# Patient Record
Sex: FEMALE | Employment: FULL TIME | URBAN - METROPOLITAN AREA
[De-identification: names, ages, dates, MRNs, and addresses within clinical notes are randomized per-mention and may not be internally consistent; named-entity substitution may affect disease eponyms.]

---

## 2022-01-10 ENCOUNTER — HOSPITAL ENCOUNTER (OUTPATIENT)
Dept: PHYSICAL THERAPY | Facility: CLINIC | Age: 72
Setting detail: THERAPIES SERIES
Discharge: HOME | End: 2022-01-10
Payer: MEDICARE

## 2022-01-10 PROCEDURE — 97110 THERAPEUTIC EXERCISES: CPT

## 2022-01-10 PROCEDURE — 97161 PT EVAL LOW COMPLEX 20 MIN: CPT

## 2022-01-10 PROCEDURE — 97016 VASOPNEUMATIC DEVICE THERAPY: CPT

## 2022-01-10 NOTE — PRE-CERTIFICATION NOTE
Latanya Fall Risk Assessment    Patient Name:  Ti Wharton  : 1950        Risk Factor Scale  Score   History of Falls [] Yes  [x] No 25  0    Secondary Diagnosis [] Yes  [x] No 15  0    Ambulatory Aid [] Furniture  [] Crutches/cane/walker  [x] None/bedrest/wheelchair/nurse 30  15  0    IV/Heparin Lock [] Yes  [x] No 20  0    Gait/Transferring [] Impaired  [] Weak  [x] Normal/bedrest/immobile 20  10  0    Mental Status [] Forgets limitations  [x] Oriented to own ability 15  0       Total: 0     Based on the Assessment score: check the appropriate box.     [x]  No intervention needed   Low =   Score of 0-24    []  Use standard prevention interventions Moderate =  Score of 24-44   [] Give patient handout and discuss fall prevention strategies   [] Establish goal of education for patient/family RE: fall prevention strategies    []  Use high risk prevention interventions High = Score of 45 and higher   [] Give patient handout and discuss fall prevention strategies   [] Establish goal of education for patient/family Re: fall prevention strategies   [] Discuss lifeline / other resources    Electronically signed by:   Betty Dale, PT  Date: 1/10/2022

## 2022-01-10 NOTE — CONSULTS
[] [address]  [address]  [phone]  F: [phone] [] [address]  [place] [address]   [address]  P: [phone]  F: [phone] [] [place]  [address]  P: [phone]  F: [phone] [] [address]  [facility]   [address]   [place]: [phone]  F: [phone]  [] [address]  P: [phone]  F: [phone]      Physical Therapy Lower Extremity Evaluation    Date:  1/10/2022  Patient: Jeannette Dukes  :  1950  MRN: [id number]  Physician: Dr. Blue Draft: Medicare  Medical Diagnosis: L TKA  Rehab Codes: T84. 093A  Onset date: DOS 10/11/21  Next 's appt. : 22    Subjective:   CC: Pt had a TKA in October, PT was delayed for 2 weeks. Pt had a manipulation on , utilizing a CPM machine 6 hours a day. Pt reports more stiffness versus pain. PMHx: [] Unremarkable [] Diabetes [] HTN  [] Pacemaker   [] MI/Heart Problems [] Cancer [] Arthritis [] Other:              [x] Refer to full medical chart  In EPIC         Tests: [x] X-Ray: [] MRI:  [] Other:    Medications: [x] Refer to full medical record [] None [] Other:  Allergies:      [x] Refer to full medical record [] None [] Other:        Marital Status    Home type One level   Stairs from outside 2 steps, descends lateral, ascends non reciprocally            Hand rail 1 rail   Stairs inside            Hand rail    Employment Resident [employer]   Job status Full Time   Work Activities/duties  Computer, office   Recreational Activities Goes to the gym and does equipment        Pain present?  Denies   Location L knee   Pain Rating currently 0/10   Pain at worse 2/10   Pain at best 0/10   Description of pain Ache at times   Altered Sensation Numbness and stiffness   What makes it worse Resting    What makes it better Movement    Symptom progression Improving    Sleep Not effected         ADL/IADL Previous level of function Current level of function Who currently assists the patient with task   Bathing  [x] Independent  [] Assist [x] Independent  [] Assist    Dress/grooming [x] Independent  [] Assist [x] Independent  [] Assist    Transfer/mobility [x] Independent  [] Assist [x] Independent  [] Assist    Feeding [x] Independent  [] Assist [x] Independent  [] Assist    Toileting [x] Independent  [] Assist [x] Independent  [] Assist    Driving [x] Independent  [] Assist [x] Independent  [] Assist    Housekeeping [x] Independent  [] Assist [x] Independent  [] Assist    Grocery shop/meal prep [x] Independent  [] Assist [x] Independent  [] Assist      Gait Prior level of function Current level of function    [x] Independent  [] Assist [x] Independent  [] Assist   Device: [] Independent [] Independent    [] Straight Cane [] Quad cane [] Straight Cane [] Quad cane    [] Standard walker [] Rolling walker   [] 4 wheeled walker [] Standard walker [] Rolling walker   [] 4 wheeled walker    [] Wheelchair [] Wheelchair           Objective:    ROM  ° A/P STRENGTH    Left Right Left Right   Hip Flex   4/5 4+/5   Ext       ER       IR       ABD       ADD       Knee Flex 96/99 110/116 4/5 4+/5   Ext 6/0 0 3-/5 4+/5       OBSERVATION No Deficit Deficit Not Tested Comments   Posture       Forward Head [] [] []    Rounded Shoulders [] [] []    Observation           Palpation [] [x] [] TTP L knee globally    Sensation [] [] []    Edema [] [] []    Neurological [] [] []    Patellar Mobility [] [] []    Patellar Orientation [] [] []    Gait [] [x] [] Analysis: Lacking full extension at heel strike          FUNCTION Normal Difficult Unable   Sitting [x] [] []   Standing [] [x] []   Ambulation [] [x] []   Groom/Dress [x] [] []   Lift/Carry [x] [] [] Stairs [] [x] []   Bending [] [x] []   Squat [] [x] []   Kneel [] [] [x]                                Functional Test: LEFS Score: 47% functionally impaired       Assessment:  Patient would benefit from skilled physical therapy services in order to: Increase L knee active and passive ROM while also increase L quad strength. Pt will also benefit from continued PT to address gait deficits and improve ability on stairs. Goals:        STG: (to be met in 10 treatments)  1. ? Pain: Decrease pain levels to 0/10  2. ? ROM: Increase flexibility and AROM limitations throughout to equal bilat to reduce difficulty with ADLs, increase L knee AROM to 0-100deg  3. ? Strength: Increase L knee quad strength to 4+/5  4. ? Function: Pt to ascend steps with a reciprocal gait pattern  5. Independent with Home Exercise Programs      LTG: (to be met in 20 treatments)  1. Improve score on assessment tool from 47% impaired to 25% impaired demonstrating an improvement in ADLs  2. Reduce pain levels to 0/10                   Patient goals:    Rehab Potential:  [x] Good  [] Fair  [] Poor   Suggested Professional Referral:  [x] No  [] Yes:  Barriers to Goal Achievement[de-identified]  [x] No  [] Yes:  Domestic Concerns:  [x] No  [] Yes:    Pt. Education:  [x] Plans/Goals, Risks/Benefits discussed  [x] Home exercise program    Method of Education: [x] Verbal  [x] Demo  [x] Written  Comprehension of Education:  [x] Verbalizes understanding. [x] Demonstrates understanding. [x] Needs Review. [] Demonstrates/verbalizes understanding of HEP/Ed previously given. Access Code: [email or web address]  URL: [email or web address]  Date: 77/66/1979  Prepared by: Marjorie Dominguez    Exercises  Small Range Straight Leg Raise - 1 x daily - 7 x weekly - 2 sets - 10 reps  Supine Quad Set - 1 x daily - 7 x weekly - 10 reps - 2 sets - 5\" hold  Standing Knee Flexion Stretch on Step - 1 x daily - 7 x weekly - 2 sets - 10 reps  Supine Heel Slide with Strap - 1 x daily - 7 x weekly - 2 sets - 10 reps  Standing Terminal Knee Extension with Resistance - 1 x daily - 7 x weekly - 2 sets - 10 reps      Treatment Plan:  [x] Therapeutic Exercise    [] Aquatic Therapy  [x] Manual Therapy   [] Electrical Stimulation  [x] Instruction in HEP      [] Lumbar/Cervical Traction  [] Neuromuscular Re-education [x] Cold/hotpack  [] Iontophoresis: 4 mg/mL  [x] Vasocompression (GameReady)                    Dexamethasone Sodium  [x] Gait Training             Phosphate 40-80 mAmin  [] Integrative Dry Needling         []  Medication allergies reviewed for use of    Dexamethasone Sodium Phosphate 4mg/ml     with iontophoresis treatments. Pt is not allergic.     Frequency:  5 x/week for 20 visits; 5x/wk for the first two weeks, 3x/wk after     Todays Treatment:    Exercises:  Exercise  S/p L knee TKA manipulation    Reps/ Time Weight/ Level Comments   Recumbent bike 6' Seat 7          *TKE 2x10 orange    *Stair knee flexion stretch 3x30\"     TG squats 2x10 L18    Heel tap 2x10 2\" Attempted 4\" step, however too difficult with hip compensation noted   *Supine heel slides with strap for overpressure 2x10     *SLR  2x10  Cues to perform quad set first                                              Other:    Vasocompression: 15' L knee 34deg with bolster    Specific Instructions for next treatment: Focus on ROM and quad strengthening     Evaluation Complexity:  History (Personal factors, comorbidities) [x] 0 [] 1-2 [] 3+   Exam (limitations, restrictions) [x] 1-2 [] 3 [] 4+   Clinical presentation (progression) [x] Stable [] Evolving  [] Unstable   Decision Making [x] Low [] Moderate [] High    [x] Low Complexity [] Moderate Complexity [] High Complexity       Treatment Charges: Mins Units   [x] Evaluation       [x]  Low       []  Moderate       []  High 15 1   []  Modalities     [x]  Ther Exercise 25 2   []  Manual Therapy     []  Ther Activities     []  Aquatics     [x]  Vasocompression 15 1   []  Other TOTAL TREATMENT TIME: 55 minutes    Time in: 1345   Time Out: 1440    Electronically signed by: Ren Holm PT        Physician Signature:________________________________Date:__________________  By signing above or cosigning this note, I have reviewed this plan of care and certify a need for medically necessary rehabilitation services.      *PLEASE SIGN ABOVE AND FAX BACK ALL PAGES*

## 2022-01-10 NOTE — PRE-CERTIFICATION NOTE
Medicare Cap   [] Physical Therapy  [] Speech Therapy  [] Occupational therapy  *PT and Speech caps combine      $2150 Limit for PT and Speech combined  $2150 Limit for OT individually  At the beginning of the month where you expect to go over $2150, please add the [address] [place] [address] modifier      Patient Name: Kathrin Telles  YOB: 1950    Note:  This is an estimate of charges billed.      Date of [address] Name # units/ charge $$$ charge Daily Total Charge Ongoing Total $$$   1/10 Eval Low  Therex  Vaso 1+2+1 98.01+22.84+22.84+9.21 152.9 152.9

## 2022-01-11 ENCOUNTER — HOSPITAL ENCOUNTER (OUTPATIENT)
Dept: PHYSICAL THERAPY | Facility: CLINIC | Age: 72
Setting detail: THERAPIES SERIES
Discharge: HOME | End: 2022-01-11
Payer: MEDICARE

## 2022-01-11 PROCEDURE — 97110 THERAPEUTIC EXERCISES: CPT

## 2022-01-11 PROCEDURE — 97016 VASOPNEUMATIC DEVICE THERAPY: CPT

## 2022-01-11 NOTE — FLOWSHEET NOTE
[] [address]  [address]  [phone]  F: [phone] [] [address]  [address]   [address]  P: [phone]  F: [phone] [] [place]  [address]  P: [phone]  F: [phone] [x] [address]  P: [phone]  F: [phone] [] [address]  [facility]   [address]   [place]: [phone]  F: [phone]      Physical Therapy Daily Treatment Note    Date:  2022  Patient Name:  Ru Fontaine    :  1950  MRN: [id number]  Physician: Dr. Briscoe Kulpmont: Medicare  Medical Diagnosis: L TKA                Rehab Codes: G57. 093A  Onset date: DOS 10/11/21                Next Dr's appt. : 22    Visit# / total visits: ; Progress note for Medicare patient due at visit 10. Cancels/No Shows: 0/0    Subjective:    Pain:  [x] Yes  [] No Location: L Knee Pain Rating: (0-10 scale) 1-2/10  Pain altered Tx:  [x] No  [] Yes  Action:  Comments: Pt reports slight increased soreness in L knee after last visit.       Objective:  Exercises:  Exercise  S/p L knee TKA manipulation     Reps/ Time Weight/ Level Comments    Upright bike 6' Seat 5   x   Prone quad stretch  3x30\"   x   LAQ 2x15   x              *TKE 2x10 orange   -   *Stair knee flexion stretch 3x30\"     -   TG- DL squats 3x10 L22  with quad set  x   TG- SL squats  2x10 L14 With quad set  x   Heel tap 2x10 2\" Attempted 4\" step, however too difficult with hip compensation noted    *Supine heel slides with strap for overpressure 2x15    with quad sets  x   *SLR  2x10   Cues to perform quad set first                                                                               Other:     Vasocompression: 15' L knee 34deg with bolster     Specific Instructions for next treatment: Focus on ROM and quad strengthening: Measure ROM       Treatment Charges: Mins Units   [x]  Modalities:MHP  8 N/C   [x]  Ther Exercise 40 3   [x]  Manual Therapy 5 N/C   []  Ther Activities     []  Aquatics     [x]  Vasocompression 10 1   []  Other     Total Treatment time 63 3        Assessment: [x] Progressing toward goals. Initiated tx with MHP to L knee, with knee ext prop x8' with fair to good digna. Pt then able to complete upright bike x6' with good digna. PTA then performed STM and IASTM to L quad with positive relief rpeorted after. Continued focusing on TKE with good digna. Ended with vaso x10' with positive relief after. [] No change. [] Other:    [x] Patient would continue to benefit from skilled physical therapy services in order to: Increase L knee active and passive ROM while also increase L quad strength. Pt will also benefit from continued PT to address gait deficits and improve ability on stairs.          Goals:  STG: (to be met in 10 treatments)  1. ? Pain: Decrease pain levels to 0/10  2. ? ROM: Increase flexibility and AROM limitations throughout to equal bilat to reduce difficulty with ADLs, increase L knee AROM to 0-100deg  3. ? Strength: Increase L knee quad strength to 4+/5  4. ? Function: Pt to ascend steps with a reciprocal gait pattern  5. Independent with Home Exercise Programs        LTG: (to be met in 20 treatments)  1. Improve score on assessment tool from 47% impaired to 25% impaired demonstrating an improvement in ADLs  2. Reduce pain levels to 0/10      Pt. Education:  [x] Yes  [] No  [] Reviewed Prior HEP/Ed  Method of Education: [] Verbal  [] Demo  [] Written  Comprehension of Education:  [] Verbalizes understanding. [] Demonstrates understanding. [x] Needs review. [x] Demonstrates/verbalizes HEP/Ed previously given.      Plan: [x] Continue current frequency toward long and short term goals.     [] Specific Instructions for subsequent treatments:   Frequency:  5 x/week for 20 visits; 5x/wk for the first two weeks, 3x/wk after       Time In: 4:00pm           Time Out: 5:00pm    Electronically signed by:  Alexandra Owens PTA

## 2022-01-12 ENCOUNTER — HOSPITAL ENCOUNTER (OUTPATIENT)
Dept: PHYSICAL THERAPY | Facility: CLINIC | Age: 72
Setting detail: THERAPIES SERIES
Discharge: HOME | End: 2022-01-12
Payer: MEDICARE

## 2022-01-12 PROCEDURE — 97110 THERAPEUTIC EXERCISES: CPT

## 2022-01-12 PROCEDURE — 97016 VASOPNEUMATIC DEVICE THERAPY: CPT

## 2022-01-12 NOTE — PRE-CERTIFICATION NOTE
Medicare Cap   [] Physical Therapy  [] Speech Therapy  [] Occupational therapy  *PT and Speech caps combine      $2150 Limit for PT and Speech combined  $2150 Limit for OT individually  At the beginning of the month where you expect to go over $2150, please add the [address] [place] modifier      Patient Name: Sherry Belcher  YOB: 1950    Note:  This is an estimate of charges billed.      Date of [address] Name # units/ charge $$$ charge Daily Total Charge Ongoing Total $$$   1/10 Eval Low  Therex  Vaso 1+2+1 98.01+22.84+22.84+9.21 152.9 152.9   1/11 Therex   Vaso 3+1 29.21+22.84+22.84+9.21 84.1 237   1/12 Therex 2+1 29.21+22.84+9.21 61.26 298.26

## 2022-01-13 ENCOUNTER — HOSPITAL ENCOUNTER (OUTPATIENT)
Dept: PHYSICAL THERAPY | Facility: CLINIC | Age: 72
Setting detail: THERAPIES SERIES
Discharge: HOME | End: 2022-01-13
Payer: MEDICARE

## 2022-01-13 PROCEDURE — 97016 VASOPNEUMATIC DEVICE THERAPY: CPT

## 2022-01-13 PROCEDURE — 97110 THERAPEUTIC EXERCISES: CPT

## 2022-01-13 NOTE — FLOWSHEET NOTE
[] [address]  [address]  [phone]  F: [phone] [] [address]  [facility]   [address]  P: [phone]  F: [phone] [] [place]  [address]  P: [phone]  F: [phone] [x] [address]  P: [phone]  F: [phone] [] [address]  [facility]   [address]   [place]: [phone]  F: [phone]      Physical Therapy Daily Treatment Note    Date:  2022  Patient Name:  Michelle Bird    :  1950  MRN: [id number]  Physician: Dr. Torre Guard: Medicare  Medical Diagnosis: L TKA                Rehab Codes: X33. 093A  Onset date: DOS 10/11/21                Next Dr's appt. : 22    Visit# / total visits: ; Progress note for Medicare patient due at visit 10. Cancels/No Shows: 0/0    Subjective:    Pain:  [x] Yes  [] No Location: L Knee Pain Rating: (0-10 scale) 1-2/10  Pain altered Tx:  [x] No  [] Yes  Action:  Comments: Pt reported no change in pain level since last visit.      Objective:  Exercises: TAKE ROM MEASUREMENT   Exercise  S/p L knee TKA manipulation     Reps/ Time Weight/ Level Comments    Upright bike 6' Seat 5   x   Prone quad stretch  3x30\"   x   LAQ 2x15   x              *TKE 2x10 orange   -   *Stair knee flexion stretch 3x30\"     -   TG- DL squats 3x10 L22  with quad set  x   TG- SL squats  2x10 L16 With quad set  x   Heel tap 2x10 2\" Attempted 4\" step, however too difficult with hip compensation noted    *Supine heel slides with strap for overpressure 2x15    with quad sets  x   *SLR  2x10   Cues to perform quad set first  x                                                                                Other: Written  Comprehension of Education:  [] Verbalizes understanding. [] Demonstrates understanding. [x] Needs review. [x] Demonstrates/verbalizes HEP/Ed previously given. Plan: [x] Continue current frequency toward long and short term goals.     [] Specific Instructions for subsequent treatments:   Frequency:  5 x/week for 20 visits; 5x/wk for the first two weeks, 3x/wk after       Time In: 1700           Time Out: 7499    Electronically signed by:  Paras Lowe PTA

## 2022-01-13 NOTE — PRE-CERTIFICATION NOTE
Medicare Cap   [x] Physical Therapy  [] Speech Therapy  [] Occupational therapy  *PT and Speech caps combine      $2150 Limit for PT and Speech combined  $2150 Limit for OT individually  At the beginning of the month where you expect to go over $2150, please add the [address] [place] modifier      Patient Name: Julianna Kahn  YOB: 1950    Note:  This is an estimate of charges billed.      Date of [address] Name # units/ charge $$$ charge Daily Total Charge Ongoing Total $$$   1/10 Eval Low  Therex  Vaso 1+2+1 98.01+22.84+22.84+9.21 152.9 152.9   1/11 Therex   Vaso 3+1 29.21+22.84+22.84+9.21 84.1 237   1/12 Therex 2+1 29.21+22.84+9.21 61.26 298.26   1/13 TE, VASO 3+1 29.21+22.84*2+9.21 84.10 382.36

## 2022-01-14 ENCOUNTER — HOSPITAL ENCOUNTER (OUTPATIENT)
Dept: PHYSICAL THERAPY | Facility: CLINIC | Age: 72
Setting detail: THERAPIES SERIES
Discharge: HOME | End: 2022-01-14
Payer: MEDICARE

## 2022-01-14 PROCEDURE — 97110 THERAPEUTIC EXERCISES: CPT

## 2022-01-14 PROCEDURE — 97016 VASOPNEUMATIC DEVICE THERAPY: CPT

## 2022-01-14 PROCEDURE — 97140 MANUAL THERAPY 1/> REGIONS: CPT

## 2022-01-14 NOTE — FLOWSHEET NOTE
[] [address]  [address]  [phone]  F: [phone] [] [address]  [place] [address]   [address]  P: [phone]  F: [phone] [] [place]  [address]  P: [phone]  F: [phone] [x] [address]  P: [phone]  F: [phone] [] [address]  [facility]   [address]   [place]: [phone]  F: [phone]      Physical Therapy Daily Treatment Note    Date:  2022  Patient Name:  Catherine Be    :  1950  MRN: [id number]  Physician: Dr. Kathlen Klinefelter: Medicare  Medical Diagnosis: L TKA                Rehab Codes: G40. 093A  Onset date: DOS 10/11/21                Next Dr's appt. : 22    Visit# / total visits: ; Progress note for Medicare patient due at visit 10. Cancels/No Shows: 0/0    Subjective:    Pain:  [x] Yes  [] No Location: L Knee Pain Rating: (0-10 scale) -/10  Pain altered Tx:  [x] No  [] Yes  Action:  Comments: Patient states that she is sore from last treatment. Patient states that she is not experience pain more so tightness.       Objective:  Exercises: TAKE ROM MEASUREMENT   Exercise  S/p L knee TKA manipulation     Reps/ Time Weight/ Level Comments    Upright bike 6' Seat 5   x   Prone quad stretch  3x30\"  PNF contract relax  x   Hamstring stretch 3 x 30\"   x   Gastroc strech 3 x 30\" wedge  x   LAQ 2x15   -              *TKE 2x10 orange   -   *Stair knee flexion stretch 3x30\"     -   Sit to stands 2 x 10  Visual cues for equal WB x   TG- DL squats 3x10 L22  with quad set  -   TG- SL squats  2x10 L16 With quad set  -   Heel tap 2x10 4\"  x   *Supine heel slides with strap for overpressure 2x15    with quad sets  - *SLR  4 x 5   Cues for 4 step  x    Stair training   10 minutes     Ascending and descending 6\" steps. Cues to minimize circumduction and eccentric quad control without hip hike  x                                                                     Other: Manual: STM to L quad via hypervolt       Vasocompression: 15' L knee 34deg with bolster     Specific Instructions for next treatment: Focus on ROM and quad strengthening:Possible [place] Stim to L quad if pt continues with quad lag during SLR Measure ROM every other visit       1/10 1/12 1/14   L knee AROM 6-96 3-100 2-98   L knee PROM 0-99 1-106 0 -108         Treatment Charges: Mins Units   [x]  Modalities:MHP  10 -   [x]  Ther Exercise 40 2   []  Manual Therapy 8 1   []  Ther Activities     []  Aquatics     [x]  Vasocompression 15 1   []  Other     Total Treatment time 63 4       Assessment: [x] Progressing toward goals. Pt began with HP to decrease muscle tension and increase ROM. Completed stretching exercises and IASTM to the left quad via hypervolt as listed above prior to ROM measurements. Patient demonstrates improvements in passive and active ROM into flexion and extension. Completed quad strengthening exercises and stair training. Patient requiring min verbal cues to minimize circumduction and promote eccentric quad control during stair negotiation. Added sit to stand exercises with min verbal and visual cues to encourage equal WB. Ended the session with vaso compression to minimize post session soreness. [] No change. [] Other:    [x] Patient would continue to benefit from skilled physical therapy services in order to: Increase L knee active and passive ROM while also increase L quad strength.  Pt will also benefit from continued PT to address gait deficits and improve ability on stairs.          Goals:  STG: (to be met in 10 treatments)  1. ? Pain: Decrease pain levels to 0/10  2. ? ROM: Increase flexibility and AROM limitations throughout to equal bilat to reduce difficulty with ADLs, increase L knee AROM to 0-100deg  3. ? Strength: Increase L knee quad strength to 4+/5  4. ? Function: Pt to ascend steps with a reciprocal gait pattern  5. Independent with Home Exercise Programs        LTG: (to be met in 20 treatments)  1. Improve score on assessment tool from 47% impaired to 25% impaired demonstrating an improvement in ADLs  2. Reduce pain levels to 0/10      Pt. Education:  [x] Yes  [] No  [] Reviewed Prior HEP/Ed  Method of Education: [] Verbal  [] Demo  [] Written  Comprehension of Education:  [] Verbalizes understanding. [] Demonstrates understanding. [x] Needs review. [x] Demonstrates/verbalizes HEP/Ed previously given. Plan: [x] Continue current frequency toward long and short term goals. [] Specific Instructions for subsequent treatments:   Frequency:  5 x/week for 20 visits; 5x/wk for the first two weeks, 3x/wk after       Time In: 3:55 pm            Time Out: 4:10 pm    Electronically signed by:   Nelson Houston PT

## 2022-01-17 ENCOUNTER — HOSPITAL ENCOUNTER (OUTPATIENT)
Dept: PHYSICAL THERAPY | Facility: CLINIC | Age: 72
Setting detail: THERAPIES SERIES
Discharge: HOME | End: 2022-01-17
Payer: MEDICARE

## 2022-01-17 PROCEDURE — 97016 VASOPNEUMATIC DEVICE THERAPY: CPT

## 2022-01-17 PROCEDURE — 97110 THERAPEUTIC EXERCISES: CPT

## 2022-01-17 PROCEDURE — 97140 MANUAL THERAPY 1/> REGIONS: CPT

## 2022-01-17 NOTE — FLOWSHEET NOTE
[] [address]  [address]  [phone]  F: [phone] [] [address]  [place] [address]   [address]  P: [phone]  F: [phone] [] [place]  [address]  P: [phone]  F: [phone] [x] [address]  P: [phone]  F: [phone] [] [address]  [facility]   [address]   [place]: [phone]  F: [phone]      Physical Therapy Daily Treatment Note    Date:  2022  Patient Name:  Colin Barnhart    :  1950  MRN: [id number]  Physician: Dr. Hellen Dewitt: Medicare  Medical Diagnosis: L TKA                Rehab Codes: Y81. 093A  Onset date: DOS 10/11/21                Next Dr's appt. : 22    Visit# / total visits: ; Progress note for Medicare patient due at visit 10. Cancels/No Shows: 0/0    Subjective:    Pain:  [x] Yes  [] No Location: L Knee Pain Rating: (0-10 scale) -/10  Pain altered Tx:  [x] No  [] Yes  Action:  Comments: Patient arrives without pain, states to returning CPM today.    Objective:  Exercises: TAKE ROM MEASUREMENT   Exercise  S/p L knee TKA manipulation     Reps/ Time Weight/ Level Comments    Upright bike 6' Seat 5   x   Prone quad stretch  3x30\"  PNF contract relax  x   Hamstring stretch 3 x 30\"   x   Gastroc strech 3 x 30\" wedge  x   LAQ 2x15   -              *TKE 2x10 orange   -   *Stair knee flexion stretch 3x30\"     -   Sit to stands 2 x 10  Visual cues for equal WB x   TG- DL squats 3x10 L20  with quad set  x   TG- SL squats  2x10 L20 With quad set  x   Heel tap 2x10 4\"  x   *Supine heel slides with strap for overpressure 2x15    with quad sets  -   *SLR  4 x 5   Cues for 4 step  x    Stair training   x     Ascending and descending 6\" steps. Cues to minimize circumduction and eccentric quad control without hip hike  x    Step ups 2x10 6\" Mirror for feedback to avoid circumduction x   Step downs 2x10 4\" Mirror to avoid hip compensation x                                               Other: Manual: STM to L quad via hypervolt, patellar mobs all directions       Vasocompression: 15' L knee 34deg with bolster     Specific Instructions for next treatment: Focus on ROM and quad strengthening:Possible [place] Stim to L quad if pt continues with quad lag during SLR Measure ROM every other visit       1/10 1/12 1/14 1/18 1/20 1/24   L knee AROM 6-96 3-100 2-98      L knee PROM 0-99 1-106 0 -108            Treatment Charges: Mins Units   [x]  Modalities:MHP  10 -   [x]  Ther Exercise 40 2   []  Manual Therapy 10 1   []  Ther Activities     []  Aquatics     [x]  Vasocompression 15 1   []  Other     Total Treatment time 65 4       Assessment: [x] Progressing toward goals. Pt began with HP to decrease muscle tension and increase ROM. Continued onto bike followed by stair negotiation, pt with circumduction compensation noted therefore performed step ups and downs in // bars with mirror for visual feedback. Pt decreased compensations with feedback present. Ended with supine heel slides and vasocompression for post exercise pain and edema control. [] No change. [] Other:    [x] Patient would continue to benefit from skilled physical therapy services in order to: Increase L knee active and passive ROM while also increase L quad strength. Pt will also benefit from continued PT to address gait deficits and improve ability on stairs.          Goals:  STG: (to be met in 10 treatments)  1. ? Pain: Decrease pain levels to 0/10  2. ? ROM: Increase flexibility and AROM limitations throughout to equal bilat to reduce difficulty with ADLs, increase L knee AROM to 0-100deg  3. ? Strength:  Increase L knee quad strength to 4+/5  4. ? Function: Pt to ascend steps with a reciprocal gait pattern  5. Independent with Home Exercise Programs        LTG: (to be met in 20 treatments)  1. Improve score on assessment tool from 47% impaired to 25% impaired demonstrating an improvement in ADLs  2. Reduce pain levels to 0/10      Pt. Education:  [x] Yes  [] No  [] Reviewed Prior HEP/Ed  Method of Education: [] Verbal  [] Demo  [] Written  Comprehension of Education:  [] Verbalizes understanding. [] Demonstrates understanding. [x] Needs review. [x] Demonstrates/verbalizes HEP/Ed previously given. Plan: [x] Continue current frequency toward long and short term goals.     [] Specific Instructions for subsequent treatments:   Frequency:  5 x/week for 20 visits; 5x/wk for the first two weeks, 3x/wk after       Time In: 1720            Time Out: [address] [facility]    Electronically signed by:  Jonathan Harding, PT

## 2022-01-18 ENCOUNTER — HOSPITAL ENCOUNTER (OUTPATIENT)
Dept: PHYSICAL THERAPY | Facility: CLINIC | Age: 72
Setting detail: THERAPIES SERIES
Discharge: HOME | End: 2022-01-18
Payer: MEDICARE

## 2022-01-18 PROCEDURE — 97110 THERAPEUTIC EXERCISES: CPT

## 2022-01-18 PROCEDURE — 97016 VASOPNEUMATIC DEVICE THERAPY: CPT

## 2022-01-18 PROCEDURE — 97140 MANUAL THERAPY 1/> REGIONS: CPT

## 2022-01-18 NOTE — FLOWSHEET NOTE
[] [address]  [address]  [phone]  F: [phone] [] [address]  [facility]   [address]  P: [phone]  F: [phone] [] [address]  [address] [facility]  P: [phone]  F: [phone] [x] [address]  P: [phone]  F: [phone] [] [address]  [facility]   [address]   [place]: [phone]  F: [phone]      Physical Therapy Daily Treatment Note    Date:  2022  Patient Name:  Davie Miller    :  1950  MRN: [id number]  Physician: Dr. Huntley Leaver: Medicare  Medical Diagnosis: L TKA                Rehab Codes: H29. 093A  Onset date: DOS 10/11/21                Next 's appt. : 22    Visit# / total visits: ; Progress note for Medicare patient due at visit 10. Cancels/No Shows: 0/0    Subjective:    Pain:  [x] Yes  [] No Location: L Knee Pain Rating: (0-10 scale) -/10  Pain altered Tx:  [x] No  [] Yes  Action:  Comments: Patient arrives without pain, states to returning CPM today.    Objective:  Exercises: TAKE ROM MEASUREMENT   Exercise  S/p L knee TKA manipulation     Reps/ Time Weight/ Level Comments    Upright bike 6' Seat 5   x   Prone quad stretch  3x30\"  PNF contract relax - seated today  x   Hamstring stretch 2 x 30\"   x   Gastroc strech 2 x 30\" wedge  x   LAQ 2x15   -              *TKE 2x10 orange   -   *Stair knee flexion stretch 3x30\"     -   Sit to stands 2 x 10  Visual cues for equal WB x   TG- DL squats 3x10 L20  with quad set  -   TG- SL squats  2x15 L21 With quad set  x   Heel tap 2x10 4\"  x   Step ups  2x10 8\"  x   Step downs  2x10 4\" Max cues for proper technique  -          Mat        *Supine heel slides with strap for overpressure 2x15    with quad sets  -   *SLR  10x   Cues for 4 step  x    Stair training   x     Ascending and descending 6\" steps. Cues to minimize circumduction and eccentric quad control without hip hike  -                                               Other: Manual: STM to L quad via hypervolt, patellar mobs all directions       Vasocompression: 15' L knee 34deg with bolster     Specific Instructions for next treatment: Focus on ROM and quad strengthening:Possible [place] Stim to L quad if pt continues with quad lag during SLR Measure ROM every other visit       1/10/22 1/12/22 1/14/22 1/18/22 1/20/22 1/24/22   L knee AROM 6-96 3-100 2-98 2-110     L knee PROM 0-99 1-106 0 -108 0-112           Treatment Charges: Mins Units   [x]  Modalities:MHP  10 -   [x]  Ther Exercise 40 2   [x]  Manual Therapy 20 1   []  Ther Activities     []  Aquatics     [x]  Vasocompression 15 1   []  Other     Total Treatment time 65 4       Assessment: [x] Progressing toward goals. Pt began with HP with L LE propped to encourage L knee extension. PTA then performed STM to L gastroc, HS, and quadriceps. Pt demos improved L Knee PROM 0-112 deg. Focused on L LE strengthening with max cues for FWB in R LE with step ups and heel taps. Plan ot progress strengthening to improve pt's confidence in L LE with ADLs. Quad lag persists with SLR. [] No change. [] Other:    [x] Patient would continue to benefit from skilled physical therapy services in order to: Increase L knee active and passive ROM while also increase L quad strength. Pt will also benefit from continued PT to address gait deficits and improve ability on stairs.          Goals:  STG: (to be met in 10 treatments)  1. ? Pain: Decrease pain levels to 0/10  2. ? ROM: Increase flexibility and AROM limitations throughout to equal bilat to reduce difficulty with ADLs, increase L knee AROM to 0-100deg  3. ? Strength:  Increase L knee quad strength to 4+/5  4. ? Function: Pt to ascend steps with a reciprocal gait pattern  5. Independent with Home Exercise Programs        LTG: (to be met in 20 treatments)  1. Improve score on assessment tool from 47% impaired to 25% impaired demonstrating an improvement in ADLs  2. Reduce pain levels to 0/10      Pt. Education:  [x] Yes  [] No  [] Reviewed Prior HEP/Ed  Method of Education: [] Verbal  [] Demo  [] Written  Comprehension of Education:  [] Verbalizes understanding. [] Demonstrates understanding. [x] Needs review. [x] Demonstrates/verbalizes HEP/Ed previously given. Plan: [x] Continue current frequency toward long and short term goals.     [] Specific Instructions for subsequent treatments:   Frequency:  5 x/week for 20 visits; 5x/wk for the first two weeks, 3x/wk after       Time In: 5:00pm            Time Out: 6:00pm    Electronically signed by:  Manish Chun PTA

## 2022-01-19 ENCOUNTER — HOSPITAL ENCOUNTER (OUTPATIENT)
Dept: PHYSICAL THERAPY | Facility: CLINIC | Age: 72
Setting detail: THERAPIES SERIES
Discharge: HOME | End: 2022-01-19
Payer: MEDICARE

## 2022-01-19 PROCEDURE — 97016 VASOPNEUMATIC DEVICE THERAPY: CPT

## 2022-01-19 PROCEDURE — 97110 THERAPEUTIC EXERCISES: CPT

## 2022-01-19 NOTE — FLOWSHEET NOTE
[] [address]  [address]  [phone]  F: [phone] [] [address]  [facility]   [address]  P: [phone]  F: [phone] [] [place]  [address] [facility]  P: [phone]  F: [phone] [x] [address]  P: [phone]  F: [phone] [] [address]  [facility]   [address]   [place]: [phone]  F: [phone]      Physical Therapy Daily Treatment Note    Date:  2022  Patient Name:  Alan Bearden    :  1950  MRN: [id number]  Physician: Dr. Sánchez Patricia: Medicare  Medical Diagnosis: L TKA                Rehab Codes: F61. 093A  Onset date: DOS 10/11/21                Next Dr's appt. : 22    Visit# / total visits: ; Progress note for Medicare patient due at visit 10. Cancels/No Shows: 0/0    Subjective:    Pain:  [x] Yes  [] No Location: L Knee Pain Rating: (0-10 scale) -/10  Pain altered Tx:  [x] No  [] Yes  Action:  Comments: Patient states that her knee is feeling well. She had a little soreness in the morning.     Objective:  Exercises: TAKE ROM MEASUREMENT   Exercise  S/p L knee TKA manipulation     Reps/ Time Weight/ Level Comments    Upright bike 6' Seat 5   x   Prone quad stretch  3x30\"  PNF contract relax - seated today  x   Hamstring stretch 2 x 30\"   x   Gastroc strech 2 x 30\" wedge  x   LAQ 2x15   -              *TKE 2x10 orange   -   *Stair knee flexion stretch 3x30\"     -   Sit to stands 2 x 10  Visual cues for equal WB x   TG- DL squats 3x10 L20  with quad set  -   TG- SL squats  2x15 L21 With quad set  x   Heel tap 2x10 4\"  x   Step ups  2x10 8\"  x   Step downs  2x10 4\" Max cues for proper technique  -          Mat *Supine heel slides with strap for overpressure 2x15    with quad sets  -   *SLR  10x   Cues for 4 step  x    Stair training   x     Ascending and descending 6\" steps. Cues to minimize circumduction and eccentric quad control without hip hike  -                                               Other: Manual: STM to L quad via hypervolt, patellar mobs all directions       Vasocompression: 15' L knee 34deg with bolster     Specific Instructions for next treatment: Focus on ROM and quad strengthening:Possible [place] Stim to L quad if pt continues with quad lag during SLR Measure ROM every other visit       1/10/22 1/12/22 1/14/22 1/18/22 1/21/22 1/24/22   L knee AROM 6-96 3-100 2-98 2-110     L knee PROM 0-99 1-106 0 -108 0-112           Treatment Charges: Mins Units   [x]  Modalities:MHP  10 -   [x]  Ther Exercise 40 2   [x]  Manual Therapy     []  Ther Activities     []  Aquatics     [x]  Vasocompression 10 1   []  Other     Total Treatment time 60 3       Assessment: [x] Progressing toward goals. Initiated treatment with a hot pack over the patient's knee positioned in an extended position. Continued therapeutic exercises listed above; patient demonstrates difficulty with heel taps therefore completed exercises by placing full weight on the contralateral limb with focus on eccentric quadriceps contraction. Mild right trunk lean during sit to stands corrected via min verbal cues. Finished the session with vaso compression to minimize post session soreness. Will progress as tolerated. Left knee ROM measurements to be taken next treatment. [] No change. [] Other:    [x] Patient would continue to benefit from skilled physical therapy services in order to: Increase L knee active and passive ROM while also increase L quad strength.  Pt will also benefit from continued PT to address gait deficits and improve ability on stairs.          Goals:  STG: (to be met in 10 treatments)  1. ? Pain: Decrease pain levels to 0/10  2. ? ROM: Increase flexibility and AROM limitations throughout to equal bilat to reduce difficulty with ADLs, increase L knee AROM to 0-100deg  3. ? Strength: Increase L knee quad strength to 4+/5  4. ? Function: Pt to ascend steps with a reciprocal gait pattern  5. Independent with Home Exercise Programs        LTG: (to be met in 20 treatments)  1. Improve score on assessment tool from 47% impaired to 25% impaired demonstrating an improvement in ADLs  2. Reduce pain levels to 0/10      Pt. Education:  [x] Yes  [] No  [] Reviewed Prior HEP/Ed  Method of Education: [] Verbal  [] Demo  [] Written  Comprehension of Education:  [] Verbalizes understanding. [] Demonstrates understanding. [x] Needs review. [x] Demonstrates/verbalizes HEP/Ed previously given. Plan: [x] Continue current frequency toward long and short term goals. [] Specific Instructions for subsequent treatments:   Frequency:  5 x/week for 20 visits; 5x/wk for the first two weeks, 3x/wk after       Time In: 5:04pm            Time Out: 6:05pm    Electronically signed by:   Kimberly Gong PT

## 2022-01-21 ENCOUNTER — HOSPITAL ENCOUNTER (OUTPATIENT)
Dept: PHYSICAL THERAPY | Facility: CLINIC | Age: 72
Setting detail: THERAPIES SERIES
Discharge: HOME | End: 2022-01-21
Payer: MEDICARE

## 2022-01-21 PROCEDURE — 97016 VASOPNEUMATIC DEVICE THERAPY: CPT

## 2022-01-21 PROCEDURE — 97110 THERAPEUTIC EXERCISES: CPT

## 2022-01-21 NOTE — FLOWSHEET NOTE
[] [address]  [address]  [phone]  F: [phone] [] [address]  [facility]   [address]  P: [phone]  F: [phone] [] [place]  [address] [facility]  P: [phone]  F: [phone] [x] [address]  P: [phone]  F: [phone] [] [address]  [facility]   [address]   [place]: [phone]  F: [phone]      Physical Therapy Daily Treatment Note    Date:  2022  Patient Name:  Erin Andrews    :  1950  MRN: [id number]  Physician: Dr. Pamella De La Paz: Medicare  Medical Diagnosis: L TKA                Rehab Codes: S62. 093A  Onset date: DOS 10/11/21                Next Dr's appt. : 22    Visit# / total visits: ; Progress note for Medicare patient due at visit 10. Cancels/No Shows: 0/0    Subjective:    Pain:  [x] Yes  [] No Location: L Knee Pain Rating: (0-10 scale) -/10  Pain altered Tx:  [x] No  [] Yes  Action:  Comments: No pain at arrival. Slight soreness after last session. Reports HEP compliance.    Objective:  Exercises: TAKE ROM MEASUREMENT   Exercise  S/p L knee TKA manipulation     Reps/ Time Weight/ Level Comments    Upright bike 7' Seat 5   x   Prone quad stretch  3x30\"   x   Hamstring stretch 3x 30\"   x   Gastroc strech 3x 30\" wedge  x   LAQ 2x15   -              *TKE 2x10 plum   x   *Stair knee flexion stretch 3x30\"     -   Sit to stands 2 x 10  Visual cues for equal WB x   TG- DL squats 3x10 L20  with quad set  -   TG- SL squats  2x15 L21 With quad set  x   Heel tap 2x10 4\" Lateral  x   Step ups  2x10 8\"  x   Step downs  2x10 4\" Max cues for proper technique  x          Mat        Long sitting calf stretch w/ strap, heel propped, and quad set  10x10\"   x   Prone Quad Set x20   x   *Supine heel slides with strap for overpressure 2x15    with quad sets  -   *SLR  10x   Cues for 4 step  x    Stair training   x     Ascending and descending 6\" steps. Cues to minimize circumduction and eccentric quad control without hip hike  -                                               Other: Manual: STM to L quad via hypervolt, patellar mobs all directions - not today      Vasocompression: 15' L knee 34deg with bolster     Specific Instructions for next treatment: Focus on ROM and quad strengthening: Possible [place] Stim to L quad if pt continues with quad lag during SLR Measure ROM every other visit       1/10/22 1/12/22 1/14/22 1/18/22 1/21/22 1/24/22   L knee AROM 6-96 3-100 2-98 2-110 2-105    L knee PROM 0-99 1-106 0 -108 0-112 0-112          Treatment Charges: Mins Units   []  Modalities:      [x]  Ther Exercise 45 3   []  Manual Therapy     []  Ther Activities     []  Aquatics     [x]  Vasocompression 10 1   []  Other     Total Treatment time 55 4       Assessment: [x] Progressing toward goals. Bike warm up followed by stretches and standing ex. Increased resistance for TKE with good tolerance. Needs cues with step ups to avoid circumduction, and with heel taps for hip hinge. Added prone quad set to work on achieving full knee extension while avoiding compensation with glut. Able to achieve TKE with overpressure in supine. No change in flexion ROM compared with last session. Cont to end with vaso for soreness relief. [] No change. [] Other:    [x] Patient would continue to benefit from skilled physical therapy services in order to: Increase L knee active and passive ROM while also increase L quad strength.  Pt will also benefit from continued PT to address gait deficits and improve ability on stairs.      Goals:  STG: (to be met in 10 treatments)  1. ? Pain: Decrease pain levels to 0/10  2. ? ROM: Increase flexibility and AROM limitations throughout to equal bilat to reduce difficulty with ADLs, increase L knee AROM to 0-100deg  3. ? Strength: Increase L knee quad strength to 4+/5  4. ? Function: Pt to ascend steps with a reciprocal gait pattern  5. Independent with Home Exercise Programs        LTG: (to be met in 20 treatments)  1. Improve score on assessment tool from 47% impaired to 25% impaired demonstrating an improvement in ADLs  2. Reduce pain levels to 0/10      Pt. Education:  [x] Yes  [] No  [] Reviewed Prior HEP/Ed  Method of Education: [] Verbal  [] Demo  [] Written  Comprehension of Education:  [] Verbalizes understanding. [] Demonstrates understanding. [x] Needs review. [x] Demonstrates/verbalizes HEP/Ed previously given. Plan: [x] Continue current frequency toward long and short term goals.     [] Specific Instructions for subsequent treatments:   Frequency:  5 x/week for 20 visits; 5x/wk for the first two weeks, 3x/wk after       Time In: 3:58 pm            Time Out: 5:00pm    Electronically signed by:  Josefina Ludwig PTA

## 2022-01-24 ENCOUNTER — HOSPITAL ENCOUNTER (OUTPATIENT)
Dept: PHYSICAL THERAPY | Facility: CLINIC | Age: 72
Setting detail: THERAPIES SERIES
Discharge: HOME | End: 2022-01-24
Payer: MEDICARE

## 2022-01-24 PROCEDURE — 97110 THERAPEUTIC EXERCISES: CPT

## 2022-01-24 PROCEDURE — 97016 VASOPNEUMATIC DEVICE THERAPY: CPT

## 2022-01-24 NOTE — FLOWSHEET NOTE
[] [address]  [address]  [phone]  F: [phone] [] [address]  [facility]   [address]  P: [phone]  F: [phone] [] [place]  [address] [facility]  P: [phone]  F: [phone] [x] [address]  P: [phone]  F: [phone] [] [address]  [facility]   [address]   [place]: [phone]  F: [phone]      Physical Therapy Daily Treatment Note    Date:  2022  Patient Name:  Zunilda Wilosn    :  1950  MRN: [id number]  Physician: Dr. Kymberly Pineda: Medicare  Medical Diagnosis: L TKA                Rehab Codes: N83. 093A  Onset date: DOS 10/11/21                Next 's appt. : 22    Visit# / total visits: 10/20; Progress note for Medicare patient due at visit 20. Cancels/No Shows: 0/0    Subjective:    Pain:  [x] Yes  [] No Location: L Knee Pain Rating: (0-10 scale) -/10  Pain altered Tx:  [x] No  [] Yes  Action:  Comments: Pt reported no pain this date, stated that she is improving in her stair tolerance.  Noted compliance with HEP  Objective:  Exercises: TAKE ROM MEASUREMENT   Exercise  S/p L knee TKA manipulation     Reps/ Time Weight/ Level Comments    Upright bike 7' Seat 5   x   Hamstring stretch 3x 30\"   x   Gastroc strech 3x 30\" wedge  x   LAQ 2x15   -              *TKE 2x10 plum   x   *Stair knee flexion stretch 3x30\"     -   Sit to stands 3 x 10  Visual cues for equal WB x   TG- DL squats 3x10 L20  with quad set  -   TG- SL squats  2x15 L21 With quad set  x   Heel tap 2x10 4\" Lateral  x   Step ups  2x10 8\"  x   Step downs  2x10 4\" Max cues for proper technique  x          Mat        Long sitting calf stretch w/ strap, heel propped, and quad set  10x10\"   x   Prone Quad Set x20   x   Prone quad stretch 3x30\"   x   *Supine heel slides with strap for overpressure 2x15    with quad sets  -   *SLR  2x10   Cues for 4 step  x    Stair training   x     Ascending and descending 6\" steps. Cues to minimize circumduction and eccentric quad control without hip hike  -                                               Other: Manual: STM to L quad via hypervolt, patellar mobs all directions - not today      Vasocompression: 15' L knee 34deg with bolster     Specific Instructions for next treatment: Focus on ROM and quad strengthening: Possible [place] Stim to L quad if pt continues with quad lag during SLR Measure ROM every other visit       1/10/22 1/12/22 1/14/22 1/18/22 1/21/22 1/24/22   L knee AROM 6-96 3-100 2-98 2-110 2-105 0-108   L knee PROM 0-99 1-106 0 -108 0-112 0-112 0-113         Treatment Charges: Mins Units   [x]  Modalities:  5 0   [x]  Ther Exercise 51 3   []  Manual Therapy     []  Ther Activities     []  Aquatics     [x]  Vasocompression 10 1   []  Other     Total Treatment time 61 4       Assessment: [x] Progressing toward goals. Pt continued with activities listed above with good tolerance. Increased reps for sit to stand and SLR to improve LE strength. Pt received HP at the beginning of treatment to decrease muscle tension. Pt concluded treatment with vasocompression to decrease pain and soreness. Pt completed LEFS with result 60/80 (75% functional). [] No change. [] Other:    [x] Patient would continue to benefit from skilled physical therapy services in order to: Increase L knee active and passive ROM while also increase L quad strength.  Pt will also benefit from continued PT to address gait deficits and improve ability on stairs.      Goals:  STG: (to be met in 10 treatments) Goals assessed by primary PT Nikkie Collins  1. ? Pain: Decrease pain levels to 0/10 MET (1/25)  2. ? ROM: Increase flexibility and AROM limitations throughout to equal bilat to reduce difficulty with ADLs, increase L knee AROM to 0-100deg MET (1/25)  3. ? Strength: Increase L knee quad strength to 4+/5 MET (1/25)  4. ? Function: Pt to ascend steps with a reciprocal gait pattern MET (1/25), pt states continues to descend non-reciprocally  5. Independent with Home Exercise Programs MET (1/25)        LTG: (to be met in 20 treatments)  1. Improve score on assessment tool from 47% impaired to 25% impaired demonstrating an improvement in ADLs MET (1/25) Pt currently at 25% impaired  2. Reduce pain levels to 0/10 MET (1/25)  3. Pt to have increased L knee AROM from 0-115degrees (Goal added 1/25)      Pt. Education:  [x] Yes  [] No  [] Reviewed Prior HEP/Ed  Method of Education: [] Verbal  [] Demo  [] Written  Comprehension of Education:  [] Verbalizes understanding. [] Demonstrates understanding. [x] Needs review. [x] Demonstrates/verbalizes HEP/Ed previously given. Plan: [x] Continue current frequency toward long and short term goals.     [] Specific Instructions for subsequent treatments:   Frequency:  5 x/week for 20 visits; 5x/wk for the first two weeks, 3x/wk after       Time In: 8558            Time Out: 3520    Electronically signed by:  Alecia Kuo PTA

## 2022-01-25 NOTE — PROGRESS NOTES
Physical Therapy    [] [facility]) - [facility]  [facility]  [address]  [phone]  F: [phone] [] [address]  [facility]   [address]  P: [phone]  F: [phone] [] [address] &  Therapy  [address]  P: [phone]  F: [phone] [x] [address]  P: [phone]  F: [phone] [] [address]  [facility]   [address]   [place]: [phone]  F: [phone]      Physical Therapy Progress Note    Date: 2022      Patient: Davie Miller  : 1950  MRN: [id number]    Physician: Dr. Luis Miguel Quinn: Medicare  Medical Diagnosis: L MPW                [facility] [address]  Onset date: DOS 10/11/21                Next 's appt. : 22     Visit# / total visits: 10/20; Progress note for Medicare patient due at visit 20. Cancels/No Shows: 0/0     Subjective:    Pain:  [x]? Yes  []? No   Location: L Knee         Pain Rating: (0-10 scale) -/10  Pain altered Tx:  [x]? No  []? Yes  Action:  Comments: Pt reported no pain this date, stated that she is improving in her stair tolerance. Noted compliance with HEP    Objective:  Test Measurements:       1/10/22 1/12/22 1/14/22 1/18/22 1/21/22 1/24/22   L knee AROM 6-96 3-100 2-98 2-110 2-105 0-108   L knee PROM 0-99 1-106 0 -108 0-112 0-112 0-113               Assessment: [x] Progressing toward goals. Pt continued with activities listed above with good tolerance. Increased reps for sit to stand and SLR to improve LE strength. Pt received HP at the beginning of treatment to decrease muscle tension. Pt concluded treatment with vasocompression to decrease pain and soreness.  Pt completed LEFS with result 60/80 (25% impaired). Pt demonstrating improvements in strength, AROM and mobility. Will benefit from continued PT to further increase ROM to symmetrical to RLE as well as further progress gait. [] No change. [] Other:    [x] Patient would continue to benefit from skilled physical therapy services in order to: Increase L knee active and passive ROM while also increase L quad strength. Pt will also benefit from continued PT to address gait deficits and improve ability on stairs.      Goals:  STG: (to be met in 10 treatments) Goals assessed by primary PT Marjorie Dominguez  1. ? Pain: Decrease pain levels to 0/10 MET (1/25)  2. ? ROM: Increase flexibility and AROM limitations throughout to equal bilat to reduce difficulty with ADLs, increase L knee AROM to 0-100deg MET (1/25)  3. ? Strength: Increase L knee quad strength to 4+/5 MET (1/25)  4. ? Function: Pt to ascend steps with a reciprocal gait pattern MET (1/25), pt states continues to descend non-reciprocally  5. Independent with Home Exercise Programs MET (1/25)        LTG: (to be met in 20 treatments)  1. Improve score on assessment tool from 47% impaired to 25% impaired demonstrating an improvement in ADLs MET (1/25) Pt currently at 25% impaired  2. Reduce pain levels to 0/10 MET (1/25)  3.  Pt to have increased L knee AROM from 0-115degrees (Goal added 1/25)    Treatment Plan:  [x] Therapeutic Exercise   72255  [] Iontophoresis: 4 mg/mL Dexamethasone Sodium Phosphate  mAmin  15740   [] Therapeutic Activity  86710 [] Vasopneumatic cold with compression  35134    [] Gait Training   34923 [] Ultrasound   48221   [] Neuromuscular Re-education  67286 [] Electrical Stimulation Unattended  87212   [x] Manual Therapy  23868 [] Electrical Stimulation Attended  49098   [x] Instruction in HEP  [] Lumbar/Cervical Traction  74122   [] Aquatic Therapy   25579 [] Cold/hotpack    [] Massage   06307      [] Dry Needling, 1 or 2 muscles  77358   [] Biofeedback, first 15 minutes 98753  [] Biofeedback, additional 15 minutes   24529 [] Dry Needling, 3 or more muscles  20561       Patient Status:     [x] Continue per initial plan of care. [] Additional visits necessary. [] Other:             Electronically signed by Roger Oakley PT on 1/25/2022 at 9:18 AM      If you have any questions or concerns, please don't hesitate to call. Thank you for your referral.    Physician Signature:________________________________Date:__________________  By signing above or cosigning this note, I have reviewed this plan of care and certify a need for medically necessary rehabilitation services.      *PLEASE SIGN ABOVE AND FAX BACK ALL PAGES*

## 2022-01-26 ENCOUNTER — HOSPITAL ENCOUNTER (OUTPATIENT)
Dept: PHYSICAL THERAPY | Facility: CLINIC | Age: 72
Setting detail: THERAPIES SERIES
Discharge: HOME | End: 2022-01-26
Payer: MEDICARE

## 2022-01-26 PROCEDURE — 97110 THERAPEUTIC EXERCISES: CPT

## 2022-01-26 PROCEDURE — 97016 VASOPNEUMATIC DEVICE THERAPY: CPT

## 2022-01-26 NOTE — FLOWSHEET NOTE
x   *Supine heel slides with strap for overpressure 2x15    with quad sets  -   *SLR  2x10   Cues for 4 step  x    Stair training   x     Ascending and descending 6\" steps. Cues to minimize circumduction and eccentric quad control without hip hike  -                                               Other: Manual: STM to L quad via hypervolt, patellar mobs all directions - not today      Vasocompression: 15' L knee 34deg with bolster     Specific Instructions for next treatment: Focus on ROM and quad strengthening: Possible [place] Stim to L quad if pt continues with quad lag during SLR Measure ROM every other visit       1/10/22 1/12/22 1/14/22 1/18/22 1/21/22 1/24/22   L knee AROM 6-96 3-100 2-98 2-110 2-105 0-108   L knee PROM 0-99 1-106 0 -108 0-112 0-112 0-113         Treatment Charges: Mins Units   [x]  Modalities:  5 0   [x]  Ther Exercise 40 3   []  Manual Therapy     []  Ther Activities     []  Aquatics     [x]  Vasocompression 15 1   []  Other     Total Treatment time 50 4       Assessment: [x] Progressing toward goals. Pt arrives without pain, began on MHP followed by bike to decrease stiffness and prepare for exercises. Pt demonstrating improvement on stairs via completing both ascending and descending with a reciprocal gait pattern. Pt also able to complete SLR without quad lag. Ended with vasocompression to decrease pain and edema. [] No change. [] Other:    [x] Patient would continue to benefit from skilled physical therapy services in order to: Increase L knee active and passive ROM while also increase L quad strength.  Pt will also benefit from continued PT to address gait deficits and improve ability on stairs.      Goals:  STG: (to be met in 10 treatments) Goals assessed by primary PT Hetal Abreu  1. ? Pain: Decrease pain levels to 0/10 MET (1/25)  2. ? ROM: Increase flexibility and AROM limitations throughout to equal bilat to reduce difficulty with ADLs, increase L knee AROM to 0-100deg MET (1/25)  3. ? Strength: Increase L knee quad strength to 4+/5 MET (1/25)  4. ? Function: Pt to ascend steps with a reciprocal gait pattern MET (1/25), pt states continues to descend non-reciprocally  5. Independent with Home Exercise Programs MET (1/25)        LTG: (to be met in 20 treatments)  1. Improve score on assessment tool from 47% impaired to 25% impaired demonstrating an improvement in ADLs MET (1/25) Pt currently at 25% impaired  2. Reduce pain levels to 0/10 MET (1/25)  3. Pt to have increased L knee AROM from 0-115degrees (Goal added 1/25)      Pt. Education:  [x] Yes  [] No  [] Reviewed Prior HEP/Ed  Method of Education: [] Verbal  [] Demo  [] Written  Comprehension of Education:  [] Verbalizes understanding. [] Demonstrates understanding. [x] Needs review. [x] Demonstrates/verbalizes HEP/Ed previously given. Plan: [x] Continue current frequency toward long and short term goals.     [] Specific Instructions for subsequent treatments:   Frequency:  5 x/week for 20 visits; 5x/wk for the first two weeks, 3x/wk after       Time In: 1720            Time Out: 8387    Electronically signed by:  Sheeba Herrera, PT

## 2022-01-28 ENCOUNTER — HOSPITAL ENCOUNTER (OUTPATIENT)
Dept: PHYSICAL THERAPY | Facility: CLINIC | Age: 72
Setting detail: THERAPIES SERIES
Discharge: HOME | End: 2022-01-28
Payer: MEDICARE

## 2022-01-28 PROCEDURE — 97110 THERAPEUTIC EXERCISES: CPT

## 2022-01-28 PROCEDURE — 97016 VASOPNEUMATIC DEVICE THERAPY: CPT

## 2022-01-28 PROCEDURE — 97140 MANUAL THERAPY 1/> REGIONS: CPT

## 2022-01-28 NOTE — FLOWSHEET NOTE
[] [address]  [address]  [phone]  F: [phone] [] [address]  [employer] 36   [address]  P: [phone]  F: [phone] [] [address] &  Therapy  [address] [facility]  P: [phone]  F: [phone] [x] [address]  P: [phone]  F: [phone] [] [address]  [facility]   [address]   [place]: [phone]  F: [phone]      Physical Therapy Daily Treatment Note    Date:  2022  Patient Name:  Aaron Houston    :  1950  MRN: [id number]  Physician: Dr. Margaret Anton: Medicare  Medical Diagnosis: L TKA                Rehab Codes: T84. 093A  Onset date: DOS 10/11/21                Next Dr's appt. : 22    Visit# / total visits: ; Progress note for Medicare patient due at visit 20. Cancels/No Shows: 0/0    Subjective:    Pain:  [x] Yes  [] No Location: L Knee Pain Rating: (0-10 scale) -/10  Pain altered Tx:  [x] No  [] Yes  Action:  Comments: Pt states L knee feel a little stiff in the morning.      Objective:  Exercises: TAKE ROM MEASUREMENT   Exercise  S/p L knee TKA manipulation     Reps/ Time Weight/ Level Comments    Upright bike 7' Seat 5   x   Hamstring stretch 2x30\"   x   Gastroc strech 2x30\" wedge  x   LAQ 2x15   -              *TKE 2x10 plum   -   *Stair knee flexion stretch 3x30\"     x   Sit to stands 3 x 10  Visual cues for equal WB x   TG- DL squats 3x10 L20  with quad set  -   TG- SL squats  2x15 L21 With quad set  x   Heel tap 2x10 4\" Lateral  x   Step up/over  2x10 8\"  x   Step downs  2x10 4\"  -          Mat        Long sitting calf stretch w/ strap, heel propped, and quad set  10x10\"   -   Prone Quad Set x20  With ball on shin  x Prone quad stretch 3x30\"   x   *Supine heel slides with strap for overpressure 2x15    with quad sets  -   *SLR  2x10   Cues for 4 step  x    Stair training   x     Ascending and descending 6\" steps. Cues to minimize circumduction and eccentric quad control without hip hike  -                                               Other: Manual: STM to L quad, HS, and Gastroc      Vasocompression: 15' L knee 34deg with bolster     Specific Instructions for next treatment: Focus on ROM and quad strengthening: Possible [place] Stim to L quad if pt continues with quad lag during SLR Measure ROM every other visit       1/10/22 1/12/22 1/14/22 1/18/22 1/21/22 1/24/22 1/28/22   L knee AROM 6-96 3-100 2-98 2-110 2-105 0-108 0-110   L knee PROM 0-99 1-106 0 -108 0-112 0-112 0-113 0-115         Treatment Charges: Mins Units   [x]  Modalities:      [x]  Ther Exercise 35 2   [x]  Manual Therapy 10 1   []  Ther Activities     []  Aquatics     [x]  Vasocompression 10 1   []  Other     Total Treatment time 55 4       Assessment: [x] Progressing toward goals. Pt demos improving L knee PROM 0-115 today. Added contract relax stretches in prone with good digna. Pt also cont focusing on L LE strengthening without complaints of incr pain this date. [] No change. [] Other:    [x] Patient would continue to benefit from skilled physical therapy services in order to: Increase L knee active and passive ROM while also increase L quad strength. Pt will also benefit from continued PT to address gait deficits and improve ability on stairs.      Goals:  STG: (to be met in 10 treatments) Goals assessed by primary PT Annika Stroud  1. ? Pain: Decrease pain levels to 0/10 MET (1/25)  2. ? ROM: Increase flexibility and AROM limitations throughout to equal bilat to reduce difficulty with ADLs, increase L knee AROM to 0-100deg MET (1/25)  3. ? Strength:  Increase L knee quad strength to 4+/5 MET (1/25)  4. ? Function: Pt to ascend steps with a reciprocal gait pattern MET (1/25), pt states continues to descend non-reciprocally  5. Independent with Home Exercise Programs MET (1/25)        LTG: (to be met in 20 treatments)  1. Improve score on assessment tool from 47% impaired to 25% impaired demonstrating an improvement in ADLs MET (1/25) Pt currently at 25% impaired  2. Reduce pain levels to 0/10 MET (1/25)  3. Pt to have increased L knee AROM from 0-115degrees (Goal added 1/25)      Pt. Education:  [x] Yes  [] No  [] Reviewed Prior HEP/Ed  Method of Education: [] Verbal  [] Demo  [] Written  Comprehension of Education:  [] Verbalizes understanding. [] Demonstrates understanding. [x] Needs review. [x] Demonstrates/verbalizes HEP/Ed previously given. Plan: [x] Continue current frequency toward long and short term goals.     [] Specific Instructions for subsequent treatments:   Frequency:  5 x/week for 20 visits; 5x/wk for the first two weeks, 3x/wk after       Time In: 12:00pm            Time Out: 1:00pm    Electronically signed by:  Paula Sandoval PTA

## 2022-01-31 ENCOUNTER — HOSPITAL ENCOUNTER (OUTPATIENT)
Dept: PHYSICAL THERAPY | Facility: CLINIC | Age: 72
Setting detail: THERAPIES SERIES
Discharge: HOME | End: 2022-01-31
Payer: MEDICARE

## 2022-01-31 PROCEDURE — 97110 THERAPEUTIC EXERCISES: CPT

## 2022-01-31 PROCEDURE — 97016 VASOPNEUMATIC DEVICE THERAPY: CPT

## 2022-01-31 NOTE — FLOWSHEET NOTE
[] [address]  [address]  [phone]  F: [phone] [] [address]  [facility]   [address]  P: [phone]  F: [phone] [] [address] &  Therapy  [address] [facility]  P: [phone]  F: [phone] [x] [address]  P: [phone]  F: [phone] [] [address]  [facility]   [address]   [place]: [phone]  F: [phone]      Physical Therapy Daily Treatment Note    Date:  2022  Patient Name:  Meg Pearl    :  1950  MRN: [id number]  Physician: Dr. Patiño Romp: Medicare  Medical Diagnosis: L TKA                Rehab Codes: T84. 093A  Onset date: DOS 10/11/21                Next Dr's appt. : 22    Visit# / total visits: ; Progress note for Medicare patient due at visit 20. Cancels/No Shows: 0/0    Subjective:    Pain:  [] Yes  [x] No Location: L Knee Pain Rating: (0-10 scale) -/10  Pain altered Tx:  [x] No  [] Yes  Action:  Comments: Pt arrives noting stiffness in L knee but denies any other sx's.       Objective:  Exercises: TAKE ROM MEASUREMENT   Exercise  S/p L knee TKA manipulation     Reps/ Time Weight/ Level Comments    Upright bike 7' Seat 5   x   Hamstring stretch 2x30\"   x   Gastroc strech 2x30\" wedge  x   LAQ 2x15   -              *TKE 2x10 plum   -   *Stair knee flexion stretch 3x30\"     x   Sit to stands 2 x 10  Visual cues for equal WB; focus on eccentric control x   TG- DL squats 3x10 L21  with quad set  x   TG- SL squats  2x15 L21 With quad set  x   Heel tap 2x10 4\" Lateral  x   Step up/over  2x10 8\"  x   Step downs  2x10 6\" Increased step height  x          Mat        Long sitting calf stretch w/ strap, heel propped, and quad set  10x10\"   -   Prone Quad Set x20  With ball on shin  x   Prone quad stretch 3x30\"  manual x   Prone hamstring curls 20x   x   *Supine heel slides with strap for overpressure 2x15    with quad sets  -   *SLR  2x10   Cues for 4 step  x    Stair training   x3    Ascending and descending 6\" steps. Cues to minimize circumduction and eccentric quad control without hip hike  x                                               Other: Manual: STM to L quad, HS, and Gastroc -not today     Vasocompression: 15' L knee 34deg with bolster     Specific Instructions for next treatment: Focus on ROM and quad strengthening: Possible [place] Stim to L quad if pt continues with quad lag during SLR Measure ROM every other visit       1/10/22 1/12/22 1/14/22 1/18/22 1/21/22 1/24/22 1/28/22   L knee AROM 6-96 3-100 2-98 2-110 2-105 0-108 0-110   L knee PROM 0-99 1-106 0 -108 0-112 0-112 0-113 0-115         Treatment Charges: Mins Units   [x]  Modalities:      [x]  Ther Exercise 45 3   [x]  Manual Therapy     []  Ther Activities     []  Aquatics     [x]  Vasocompression 15 1   []  Other     Total Treatment time 60 4       Assessment: [x] Progressing toward goals. Initiated session on bike to reduce stiffness and promote increased ROM. Prone quad stretch performed manually this date and added hamstring curls in this position. Pt with improved form when ascending/decending stairs without presence of circumduction. Ended with vaso per pt request to reduce muscle soreness. [] No change. [] Other:    [x] Patient would continue to benefit from skilled physical therapy services in order to: Increase L knee active and passive ROM while also increase L quad strength.  Pt will also benefit from continued PT to address gait deficits and improve ability on stairs.      Goals:  STG: (to be met in 10 treatments) Goals assessed by primary PT Paulo Rim  1. ? Pain: Decrease pain levels to 0/10 MET (1/25)  2. ? ROM: Increase flexibility and AROM limitations throughout to equal bilat to reduce difficulty with ADLs, increase L knee AROM to 0-100deg MET (1/25)  3. ? Strength: Increase L knee quad strength to 4+/5 MET (1/25)  4. ? Function: Pt to ascend steps with a reciprocal gait pattern MET (1/25), pt states continues to descend non-reciprocally  5. Independent with Home Exercise Programs MET (1/25)        LTG: (to be met in 20 treatments)  1. Improve score on assessment tool from 47% impaired to 25% impaired demonstrating an improvement in ADLs MET (1/25) Pt currently at 25% impaired  2. Reduce pain levels to 0/10 MET (1/25)  3. Pt to have increased L knee AROM from 0-115 degrees (Goal added 1/25)      Pt. Education:  [x] Yes  [] No  [] Reviewed Prior HEP/Ed  Method of Education: [x] Verbal  [] Demo  [] Written  Comprehension of Education:  [] Verbalizes understanding. [] Demonstrates understanding. [x] Needs review. [x] Demonstrates/verbalizes HEP/Ed previously given. Plan: [x] Continue current frequency toward long and short term goals.     [] Specific Instructions for subsequent treatments:   Frequency:  5 x/week for 20 visits; 5x/wk for the first two weeks, 3x/wk after       Time In: 5:25            Time Out: 6:25     Electronically signed by:  Sven Terry PTA

## 2022-02-04 ENCOUNTER — HOSPITAL ENCOUNTER (OUTPATIENT)
Dept: PHYSICAL THERAPY | Facility: CLINIC | Age: 72
Setting detail: THERAPIES SERIES
Discharge: HOME | End: 2022-02-04
Payer: MEDICARE

## 2022-02-04 PROCEDURE — 97110 THERAPEUTIC EXERCISES: CPT

## 2022-02-04 NOTE — FLOWSHEET NOTE
[] [address]  [address]  [phone]  F: [phone] [] [address]  [facility] 36   [address]  P: [phone]  F: [phone] [] [address] &  Therapy  [address] [facility]  P: [phone]  F: [phone] [x] [address]  P: [phone]  F: [phone] [] [address]  [facility]   [address]   [place]: [phone]  F: [phone]      Physical Therapy Daily Treatment Note    Date:  2022  Patient Name:  Meg Pearl    :  1950  MRN: [id number]  Physician: Dr. Patiño Romp: Medicare  Medical Diagnosis: L TKA                Rehab Codes: T84. 093A  Onset date: DOS 10/11/21                Next 's appt. : 22    Visit# / total visits: ; Progress note for Medicare patient due at visit 20. Cancels/No Shows: 0/0    Subjective:    Pain:  [] Yes  [x] No Location: L Knee Pain Rating: (0-10 scale) -/10  Pain altered Tx:  [x] No  [] Yes  Action:  Comments: Pt reports increased stiffness when she is in one position for too long. Pt reports semi-compliance to HEP.       Objective:  Exercises: TAKE ROM MEASUREMENT   Exercise  S/p L knee TKA manipulation     Reps/ Time Weight/ Level Comments    Upright bike 7' Seat 5   x   Hamstring stretch 2x30\"   x   Gastroc strech 2x30\" wedge  x   LAQ 2x15   -              *TKE 2x10 plum   x   *Stair knee flexion stretch 3x30\"     x   Sit to stands 2 x 10  Visual cues for equal WB; focus on eccentric control x   TG- DL squats 3x10 L21  with quad set  -   TG- SL squats  2x15 L21 With quad set  x   Heel tap 3x10 6\" Lateral  x   Step up/over  2x10 8\"  x   Step downs  2x10 6\" Increased step height  -          Mat        Long sitting calf stretch w/ strap, heel propped, and quad set  10x10\"   -   Prone Quad Set x20  With ball on shin  -   Prone quad stretch 3x30\"  Strap; contract/relay passive str  x          Prone hamstring curls 20x   -   *Supine heel slides with strap for overpressure 15x    with overpressure during flex and ext by PTA  x   *SLR  2x10   Cues for 4 step  -    Stair training   x3    Ascending and descending 6\" steps. Cues to minimize circumduction and eccentric quad control without hip hike  -                                               Other: Manual: STM to L quad, HS, and Gastroc -not today     Vasocompression: 15' L knee 34deg with bolster     Specific Instructions for next treatment: Focus on ROM and quad strengthening: Possible [place] Stim to L quad if pt continues with quad lag during SLR Measure ROM every other visit       1/10/22 1/12/22 1/14/22 1/18/22 1/21/22 1/24/22 1/28/22 2/4/22   L knee AROM 6-96 3-100 2-98 2-110 2-105 0-108 0-110 5-108   L knee PROM 0-99 1-106 0 -108 0-112 0-112 0-113 0-115 0-112         Treatment Charges: Mins Units   []  Modalities:      [x]  Ther Exercise 55 4   []  Manual Therapy     []  Ther Activities     []  Aquatics     []  Vasocompression     []  Other     Total Treatment time 55 4       Assessment: [] Progressing toward goals. [] No change. [x] Other: Initiated session on bike to reduce stiffness and promote increased motion. Pt then completed seated heel prop with MHP x 5' to encourage knee extension. PTA emphasized importance of HEP with handout given of heel prop on stairs, seated knee flex stretch, and  Prone quad stretch; to perform 3-4x/day everyday. [x] Patient would continue to benefit from skilled physical therapy services in order to: Increase L knee active and passive ROM while also increase L quad strength.  Pt will also benefit from continued PT to address gait deficits and improve ability on stairs.      Goals:  STG: (to be met in 10 treatments) Goals assessed by primary PT Drenda Else  1. ? Pain: Decrease pain levels to 0/10 MET (1/25)  2. ? ROM: Increase flexibility and AROM limitations throughout to equal bilat to reduce difficulty with ADLs, increase L knee AROM to 0-100deg MET (1/25)  3. ? Strength: Increase L knee quad strength to 4+/5 MET (1/25)  4. ? Function: Pt to ascend steps with a reciprocal gait pattern MET (1/25), pt states continues to descend non-reciprocally  5. Independent with Home Exercise Programs MET (1/25)        LTG: (to be met in 20 treatments)  1. Improve score on assessment tool from 47% impaired to 25% impaired demonstrating an improvement in ADLs MET (1/25) Pt currently at 25% impaired  2. Reduce pain levels to 0/10 MET (1/25)  3. Pt to have increased L knee AROM from 0-115 degrees (Goal added 1/25)      Pt. Education:  [x] Yes  [] No  [] Reviewed Prior HEP/Ed  Method of Education: [x] Verbal  [] Demo  [] Written  Comprehension of Education:  [] Verbalizes understanding. [] Demonstrates understanding. [x] Needs review. [x] Demonstrates/verbalizes HEP/Ed previously given. Plan: [x] Continue current frequency toward long and short term goals.     [] Specific Instructions for subsequent treatments:   Frequency:  5 x/week for 20 visits; 5x/wk for the first two weeks, 3x/wk after       Time In: 1:00pm            Time Out: 2:00pm     Electronically signed by:  Ariadna Mccurdy PTA

## 2022-02-07 ENCOUNTER — HOSPITAL ENCOUNTER (OUTPATIENT)
Dept: PHYSICAL THERAPY | Facility: CLINIC | Age: 72
Setting detail: THERAPIES SERIES
Discharge: HOME | End: 2022-02-07
Payer: MEDICARE

## 2022-02-07 PROCEDURE — 97110 THERAPEUTIC EXERCISES: CPT

## 2022-02-07 NOTE — FLOWSHEET NOTE
[] [address]  [address]  [phone]  F: [phone] [] [address]  [facility] 36   [address]  P: [phone]  F: [phone] [] [address]  Therapy  [address] [facility]  P: [phone]  F: [phone] [x] [address]  P: [phone]  F: [phone] [] [address]  [facility]   [address]   [place]: [phone]  F: [phone]      Physical Therapy Daily Treatment Note    Date:  2022  Patient Name:  Savanna Mesa    :  1950  MRN: [id number]  Physician: Dr. Soto Morning: Medicare  Medical Diagnosis: L TKA                Rehab Codes: T84. 093A  Onset date: DOS 10/11/21                Next Dr's appt. : 22    Visit# / total visits: 15/20; Progress note for Medicare patient due at visit 20. Cancels/No Shows: 0/0    Subjective:    Pain:  [] Yes  [x] No Location: L Knee Pain Rating: (0-10 scale) -/10  Pain altered Tx:  [x] No  [] Yes  Action:  Comments: Pt reports increased stiffness when she is in one position for too long. Pt reports semi-compliance to HEP.       Objective:  Exercises: TAKE ROM MEASUREMENT   Exercise  S/p L knee TKA manipulation     Reps/ Time Weight/ Level Comments    Upright bike 7' Seat 5   x   Hamstring stretch 2x30\"   x   Gastroc strech 2x30\" wedge  x   LAQ 2x15   -              *TKE 2x10 plum   x   *Stair knee flexion stretch 3x30\"     x   Sit to stands 2 x 10  Visual cues for equal WB; focus on eccentric control x   TG- DL squats 3x10 L21  with quad set  -   TG- SL squats  2x15 L21 With quad set  x   Heel tap 3x10 6\" Lateral  x   Step up/over  2x10 8\"  x   Step downs  2x10 6\" Increased step height  -          Mat        Long sitting calf stretch w/ strap, heel propped, and quad set  10x10\"   -   Prone Quad Set x20  With ball on shin  -   Prone quad stretch 3x30\"  Strap; contract/relay passive str  -          Prone hamstring curls 20x   -   *Supine heel slides with strap for overpressure 15x    with overpressure during flex and ext by PTA  -   *SLR  2x10   Cues for 4 step  x    Stair training   x3    Ascending and descending 6\" steps. Cues to minimize circumduction and eccentric quad control without hip hike  -                                               Other: Manual: STM to L quad, HS, and Gastroc -not today     Vasocompression: 15' L knee 34deg with bolster- not today, pt requests to ice at home     Specific Instructions for next treatment: Focus on ROM and quad strengthening: Possible [place] Stim to L quad if pt continues with quad lag during SLR Measure ROM every other visit       1/10/22 1/12/22 1/14/22 1/18/22 1/21/22 1/24/22 1/28/22 2/4/22   L knee AROM 6-96 3-100 2-98 2-110 2-105 0-108 0-110 5-108   L knee PROM 0-99 1-106 0 -108 0-112 0-112 0-113 0-115 0-112         Treatment Charges: Mins Units   [x]  Modalities:  5 0   [x]  Ther Exercise 45 3   []  Manual Therapy     []  Ther Activities     []  Aquatics     []  Vasocompression     []  Other     Total Treatment time 50 3   Units shared between [employer] PTA and Jennifer Marroquin PT    Assessment: [x] Progressing toward goals. Initiated session with MHP followed by nimishake to decrease stiffness and increase overall ROM prior to strengthening exercises. Continued with strengthening as listed above, noting a lack of hip compensation. Ended with SLR which pt was able to complete without quad lag. Pt declined vaso, states will ice at home      [] No change. [] Other:     [x] Patient would continue to benefit from skilled physical therapy services in order to: Increase L knee active and passive ROM while also increase L quad strength.  Pt will also benefit from continued PT to address gait deficits and improve ability on stairs.      Goals:  STG: (to be met in 10 treatments) Goals assessed by primary PT Allen Marie  1. ? Pain: Decrease pain levels to 0/10 MET (1/25)  2. ? ROM: Increase flexibility and AROM limitations throughout to equal bilat to reduce difficulty with ADLs, increase L knee AROM to 0-100deg MET (1/25)  3. ? Strength: Increase L knee quad strength to 4+/5 MET (1/25)  4. ? Function: Pt to ascend steps with a reciprocal gait pattern MET (1/25), pt states continues to descend non-reciprocally  5. Independent with Home Exercise Programs MET (1/25)        LTG: (to be met in 20 treatments)  1. Improve score on assessment tool from 47% impaired to 25% impaired demonstrating an improvement in ADLs MET (1/25) Pt currently at 25% impaired  2. Reduce pain levels to 0/10 MET (1/25)  3. Pt to have increased L knee AROM from 0-115 degrees (Goal added 1/25)      Pt. Education:  [x] Yes  [] No  [] Reviewed Prior HEP/Ed  Method of Education: [x] Verbal  [] Demo  [] Written  Comprehension of Education:  [] Verbalizes understanding. [] Demonstrates understanding. [x] Needs review. [x] Demonstrates/verbalizes HEP/Ed previously given. Plan: [x] Continue current frequency toward long and short term goals.     [] Specific Instructions for subsequent treatments:   Frequency:  5 x/week for 20 visits; 5x/wk for the first two weeks, 2-3x/wk after       Time In: 1702            Time Out: [facility]     Electronically signed by:  Lizbeth Henderson PTA

## 2022-02-10 ENCOUNTER — HOSPITAL ENCOUNTER (OUTPATIENT)
Dept: PHYSICAL THERAPY | Facility: CLINIC | Age: 72
Setting detail: THERAPIES SERIES
Discharge: HOME | End: 2022-02-10
Payer: MEDICARE

## 2022-02-10 PROCEDURE — 97110 THERAPEUTIC EXERCISES: CPT

## 2022-02-10 NOTE — FLOWSHEET NOTE
[] [address]  [address]  [phone]  F: [phone] [] [address]  [facility] 36   [address]  P: [phone]  F: [phone] [] [address]  Therapy  [address] [facility]  P: [phone]  F: [phone] [x] [address]  P: [phone]  F: [phone] [] [address]  [facility]   [address]   [place]: [phone]  F: [phone]      Physical Therapy Daily Treatment Note    Date:  2/10/2022  Patient Name:  Arminda Greene    :  1950  MRN: [id number]  Physician: Dr. Elmira Conde: Medicare  Medical Diagnosis: L TKA                Rehab Codes: T84. 093A  Onset date: DOS 10/11/21                Next Dr's appt. : 22    Visit# / total visits: ; Progress note for Medicare patient due at visit 20. Cancels/No Shows: 0/0    Subjective:    Pain:  [] Yes  [x] No Location: L Knee Pain Rating: (0-10 scale) -/10  Pain altered Tx:  [x] No  [] Yes  Action:  Comments: Patient states that she does not have all her strength back, however her leg is feeling less tight. Patient states that she would like to try the leg press at the gym.      Objective:  Exercises: TAKE ROM MEASUREMENT   Exercise  S/p L knee TKA manipulation     Reps/ Time Weight/ Level Comments    Upright bike 7' Seat 5   x   Hamstring stretch 3x30\"   x   Gastroc strech 2x30\" wedge  x   LAQ 2x15   -   Leg press 2 x 10  40#   x   *TKE 2x10 plum   x   *Stair knee flexion stretch 3x30\"     x   Sit to stands 2 x 15  Visual cues for equal WB; focus on eccentric control x   TG- DL squats 3x10 L21  with quad set  -   TG- SL squats  2x15 L21 With quad set  x   Heel tap 3x10 6\" Lateral  x   Step up/over  2x10 8\"  x   Step downs  2x10 6\" Increased step height 1/31 -          Mat        Long sitting calf stretch w/ strap, heel propped, and quad set  10x10\"   -   Prone Quad Set x20  With ball on shin  -   Prone quad stretch 3x30\"  Strap; contract/relay passive str  -          Prone hamstring curls 20x   -   *Supine heel slides with strap for overpressure 15x    with overpressure during flex and ext by PTA  -   *SLR  2x10   Cues for 4 step  x    Stair training   x3    Ascending and descending 6\" steps. Cues to minimize circumduction and eccentric quad control without hip hike  -                                               Other: Manual: STM to L quad, HS, and Gastroc -not today     Vasocompression: 15' L knee 34deg with bolster- not today, pt requests to ice at home     Specific Instructions for next treatment: Focus on ROM and quad strengthening: Possible [place] Stim to L quad if pt continues with quad lag during SLR Measure ROM every other visit       1/10/22 1/12/22 1/14/22 1/18/22 1/21/22 1/24/22 1/28/22 2/4/22 2/10/22   L knee AROM 6-96 3-100 2-98 2-110 2-105 0-108 0-110 5-108 2 - 105   L knee PROM 0-99 1-106 0 -108 0-112 0-112 0-113 0-115 0-112 0 - 112         Treatment Charges: Mins Units   [x]  Modalities:  5 0   [x]  Ther Exercise 45 3   []  Manual Therapy     []  Ther Activities     []  Aquatics     []  Vasocompression     []  Other     Total Treatment time 50 3       Assessment: [x] Progressing toward goals. Initiated session with MHP followed by stretches prior to taking a knee ROM measurement. Completed exercises as listed above with the addition of leg press to demonstrate machinery so that the patient is capable of completing this exercise on her own. The patient was observed to have early heel off when descending stairs causing the patient's weight to migrate anteriorly. Instructed the patient to keep her heel down to minimize anterior translation of weight which improved stair negotiation.  Will progress ROM and strength as tolerated. [] No change. [] Other:     [x] Patient would continue to benefit from skilled physical therapy services in order to: Increase L knee active and passive ROM while also increase L quad strength. Pt will also benefit from continued PT to address gait deficits and improve ability on stairs.      Goals:  STG: (to be met in 10 treatments) Goals assessed by primary PT Mike Carson  1. ? Pain: Decrease pain levels to 0/10 MET (1/25)  2. ? ROM: Increase flexibility and AROM limitations throughout to equal bilat to reduce difficulty with ADLs, increase L knee AROM to 0-100deg MET (1/25)  3. ? Strength: Increase L knee quad strength to 4+/5 MET (1/25)  4. ? Function: Pt to ascend steps with a reciprocal gait pattern MET (1/25), pt states continues to descend non-reciprocally  5. Independent with Home Exercise Programs MET (1/25)        LTG: (to be met in 20 treatments)  1. Improve score on assessment tool from 47% impaired to 25% impaired demonstrating an improvement in ADLs MET (1/25) Pt currently at 25% impaired  2. Reduce pain levels to 0/10 MET (1/25)  3. Pt to have increased L knee AROM from 0-115 degrees (Goal added 1/25)      Pt. Education:  [x] Yes  [] No  [] Reviewed Prior HEP/Ed  Method of Education: [x] Verbal  [] Demo  [] Written  Comprehension of Education:  [] Verbalizes understanding. [] Demonstrates understanding. [x] Needs review. [x] Demonstrates/verbalizes HEP/Ed previously given. Plan: [x] Continue current frequency toward long and short term goals. [] Specific Instructions for subsequent treatments:   Frequency:  5 x/week for 20 visits; 5x/wk for the first two weeks, 2-3x/wk after       Time In: 5:00 pm            Time Out: 5:55 pm     Electronically signed by:   Claire Samuels PT

## 2022-02-14 ENCOUNTER — HOSPITAL ENCOUNTER (OUTPATIENT)
Dept: PHYSICAL THERAPY | Facility: CLINIC | Age: 72
Setting detail: THERAPIES SERIES
Discharge: HOME | End: 2022-02-14
Payer: MEDICARE

## 2022-02-14 PROCEDURE — 97110 THERAPEUTIC EXERCISES: CPT

## 2022-02-14 NOTE — FLOWSHEET NOTE
[] [address]  [address]  [phone]  F: [phone] [] [address]  [place] 36   [address]  P: [phone]  F: [phone] [] [place]  [address]  P: [phone]  F: [phone] [x] [address]  P: [phone]  F: [phone] [] [address]  [facility]   [address]   [place]: [phone]  F: [phone]      Physical Therapy Daily Treatment Note    Date:  2022  Patient Name:  Colin Barnhart    :  1950  MRN: [id number]  Physician: Dr. Hellen Dewitt: Medicare  Medical Diagnosis: L TKA                Rehab Codes: T84. 093A  Onset date: DOS 10/11/21                Next Dr's appt. : 22    Visit# / total visits: ; Progress note for Medicare patient due at visit 20. Cancels/No Shows: 0/0    Subjective:    Pain:  [] Yes  [x] No Location: L Knee Pain Rating: (0-10 scale) -/10  Pain altered Tx:  [x] No  [] Yes  Action:  Comments: Pt arrives without pain, sates to only noticing weakness when ascending stairs.      Objective:  Exercises: TAKE ROM MEASUREMENT   Exercise  S/p L knee TKA manipulation     Reps/ Time Weight/ Level Comments    Upright bike 7' Seat 5   x   Hamstring stretch 3x30\"   x   Gastroc strech 2x30\" wedge  x   LAQ 2x15   -   Leg press 2 x 10  40#   x   *TKE 2x10 plum   -   *Stair knee flexion stretch 3x30\"     x   Sit to stands 2 x 15  Visual cues for equal WB; focus on eccentric control x   TG- DL squats 3x10 L21  with quad set  -   TG- SL squats  2x15 L21 With quad set  x   Heel tap 3x10 6\" Lateral  x   Step up 2x10 6\" With no UE support to increase confidence on stairs x   Step downs  2x10 6\" Increased step height  - Lateral side stepping with tband 2 laps    x   Rebounder 20x Red ball  x                 Mat        Long sitting calf stretch w/ strap, heel propped, and quad set  10x10\"   -   Prone Quad Set x20  With ball on shin  -   Prone quad stretch 3x30\"  Strap; contract/relay passive str  -          Prone hamstring curls 20x   -   *Supine heel slides with strap for overpressure 15x    with overpressure during flex and ext by PTA  -   *SLR  2x10   Cues for 4 step  x    Stair training   x3    Ascending and descending 6\" steps. Cues to minimize circumduction and eccentric quad control without hip hike  -                                               Other: Manual: STM to L quad, HS, and Gastroc -not today     Vasocompression: 15' L knee 34deg with bolster- not today, pt requests to ice at home     Specific Instructions for next treatment: Focus on ROM and quad strengthening: Possible [place] Stim to L quad if pt continues with quad lag during SLR Measure ROM every other visit       1/10/22 1/12/22 1/14/22 1/18/22 1/21/22 1/24/22 1/28/22 2/4/22 2/10/22 2/14/22   L knee AROM 6-96 3-100 2-98 2-110 2-105 0-108 0-110 5-108 2 - 105 0-105   L knee PROM 0-99 1-106 0 -108 0-112 0-112 0-113 0-115 0-112 0 - 112 0-110         Treatment Charges: Mins Units   [x]  Modalities: MHP 5 0   [x]  Ther Exercise 40 3   []  Manual Therapy     []  Ther Activities     []  Aquatics     []  Vasocompression     []  Other     Total Treatment time 45 3       Assessment: [x] Progressing toward goals. Initiated session with MHP followed by upright bike. Continued with standing exercises, shifting focus towards functional deficits that patient is still experiencing such as ascending stairs without hand rails. Practiced ascending 4\" step first without rails which pt completed without pain, therefore progressed to 6\" step which pt also completed without use of UE.  Pt noting feelign \"off balance\" when ascending, therefore added in a SLS balance activity as well (rebounder). Pt able to perform 20 throws with intermittment tapping of RLE. Ended with TG squats and leg press, which pt performed without pain. Plan is for patient to finish therapy next week. [] No change. [] Other:     [x] Patient would continue to benefit from skilled physical therapy services in order to: Increase L knee active and passive ROM while also increase L quad strength. Pt will also benefit from continued PT to address gait deficits and improve ability on stairs.      Goals:  STG: (to be met in 10 treatments) Goals assessed by primary PT Jennifer Marroquin  1. ? Pain: Decrease pain levels to 0/10 MET (1/25)  2. ? ROM: Increase flexibility and AROM limitations throughout to equal bilat to reduce difficulty with ADLs, increase L knee AROM to 0-100deg MET (1/25)  3. ? Strength: Increase L knee quad strength to 4+/5 MET (1/25)  4. ? Function: Pt to ascend steps with a reciprocal gait pattern MET (1/25), pt states continues to descend non-reciprocally  5. Independent with Home Exercise Programs MET (1/25)        LTG: (to be met in 20 treatments)  1. Improve score on assessment tool from 47% impaired to 25% impaired demonstrating an improvement in ADLs MET (1/25) Pt currently at 25% impaired  2. Reduce pain levels to 0/10 MET (1/25)  3. Pt to have increased L knee AROM from 0-115 degrees (Goal added 1/25)      Pt. Education:  [x] Yes  [] No  [] Reviewed Prior HEP/Ed  Method of Education: [x] Verbal  [] Demo  [] Written  Comprehension of Education:  [] Verbalizes understanding. [] Demonstrates understanding. [x] Needs review. [x] Demonstrates/verbalizes HEP/Ed previously given. Plan: [x] Continue current frequency toward long and short term goals.     [] Specific Instructions for subsequent treatments:   Frequency:  5 x/week for 20 visits; 5x/wk for the first two weeks, 2-3x/wk after       Time In: 450 pm            Time Out: 5:45 pm     Electronically signed by:  Edna Powell, PT

## 2022-02-17 ENCOUNTER — HOSPITAL ENCOUNTER (OUTPATIENT)
Dept: PHYSICAL THERAPY | Facility: CLINIC | Age: 72
Setting detail: THERAPIES SERIES
Discharge: HOME | End: 2022-02-17
Payer: MEDICARE

## 2022-02-17 NOTE — FLOWSHEET NOTE
[] [address] [facility]  Therapy  [address]    [phone]  F: [phone]   [] [address] [facility]   [address]  P: [phone]  F: [phone]  [] [place]  [address]  P: [phone]  F: [phone] [x] [address]  P: [phone]  F: [phone]  [] [address]  [facility]   [address]   [place]: [phone]  F: [phone]   [] [address]  [place]: [phone]   F: [phone]     Physical Therapy Cancel/No Show note    Date: 2022  Patient: Kathrin Telles  : 1950  MRN: [id number]    Cancels/No Shows to date: 1 cx/ 0 ns    For today's appointment patient:    [x]  Cancelled    [x] Rescheduled appointment    [] No-show     Reason given by patient:    []  Patient ill    []  Conflicting appointment    [] No transportation      [] Conflict with work    [] No reason given    [x] Weather related    [] COVID-19    [] Other:      Comments:        [x] Next appointment was confirmed    Electronically signed by: Gerald Moscoso

## 2022-02-22 ENCOUNTER — APPOINTMENT (OUTPATIENT)
Dept: PHYSICAL THERAPY | Facility: CLINIC | Age: 72
End: 2022-02-22
Payer: MEDICARE

## 2022-02-23 ENCOUNTER — HOSPITAL ENCOUNTER (OUTPATIENT)
Dept: PHYSICAL THERAPY | Facility: CLINIC | Age: 72
Setting detail: THERAPIES SERIES
Discharge: HOME | End: 2022-02-23
Payer: MEDICARE

## 2022-02-23 PROCEDURE — 97110 THERAPEUTIC EXERCISES: CPT

## 2022-02-23 NOTE — PROGRESS NOTES
Physical Therapy    [] [facility]  [facility]  [address]  [phone]  F: [phone] [] [address]  [place] 36   [address]  P: [phone]  F: [phone] [] [address]  Therapy  [address] [place] [facility]  P: [phone]  F: [phone] [x] [address]  P: [phone]  F: [phone] [] [address]  [facility]   [address]   [place]: [phone]  F: [phone]      Physical Therapy Progress Note    Date: 2022      Patient: Savanna Mesa  : 1950  MRN: [id number]    Patient Name:  Savanna Mesa                    :  1950                       MRN: [id number]  Physician: Dr. Charu Del Toro: Medicare  Medical Diagnosis: L USR                [facility] [address]  Onset date: DOS 10/11/21                Next Dr's appt. : 22     Visit# / total visits: ; Progress note for Medicare patient due at visit 20. Cancels/No Shows: 0/0     Subjective:    Pain:  []? Yes  [x]? No   Location: L Knee         Pain Rating: (0-10 scale) -/10  Pain altered Tx:  [x]? No  []? Yes  Action:  Comments: Pt arrives without knee pain, only having mouth pain secondary to a recent oral surgery. Daily Carson for only two more visits of therapy. Objective:  Test Measurements:    1/10/22 1/12/22 1/14/22 1/18/22 1/21/22 1/24/22 1/28/22 2/4/22 2/10/22 2/14/22 2/23/22   L knee AROM 6-96 3-100 2-98 2-110 2-105 0-108 0-110 5-108 2 - 105 0-105 0-106   L knee PROM 0-99 1-106 0 -108 0-112 0-112 0-113 0-115 0-112 0 - 112 0-110 0-114               Assessment:  [x]? Progressing toward goals. Initiated session with MHP followed by upright bike.  Followed with standing exercises, focus on navigating stairs which pt was able to complete without use of BUE. Completed goal assessment with patient meeting all goals. Pt will attend two more visits of PT and then discharge next week to [facility]. []? No change. []? Other:                           [x]? Patient would continue to benefit from skilled physical therapy services in order to: Increase L knee active and passive ROM while also increase L quad strength. Pt will also benefit from continued PT to address gait deficits and improve ability on stairs.      Goals:  STG: (to be met in 10 treatments) Goals assessed by primary PT Hui Doty  1. ? Pain: Decrease pain levels to 0/10 MET (1/25)  2. ? ROM: Increase flexibility and AROM limitations throughout to equal bilat to reduce difficulty with ADLs, increase L knee AROM to 0-100deg MET (1/25)  3. ? Strength: Increase L knee quad strength to 4+/5 MET (1/25)  4. ? Function: Pt to ascend steps with a reciprocal gait pattern MET (1/25), pt states continues to descend non-reciprocally, MET for both ascending and descending (2/23)  5. Independent with Home Exercise Programs MET (1/25)        LTG: (to be met in 20 treatments)  1. Improve score on assessment tool from 47% impaired to 25% impaired demonstrating an improvement in ADLs MET (1/25) Pt currently at 25% impaired  2. Reduce pain levels to 0/10 MET (1/25)  3.  Pt to have increased L knee AROM from 0-115 degrees (Goal       Treatment Plan:  [x] Therapeutic Exercise   92280  [] Iontophoresis: 4 mg/mL Dexamethasone Sodium Phosphate  mAmin  71206   [] Therapeutic Activity  59862 [] Vasopneumatic cold with compression  14651    [x] Gait Training   63774 [] Ultrasound   04241   [] Neuromuscular Re-education  40724 [] Electrical Stimulation Unattended  53271   [x] Manual Therapy  02252 [] Electrical Stimulation Attended  29156   [x] Instruction in HEP  [] Lumbar/Cervical Traction  [id number]   [] Aquatic Therapy   36966 [] Cold/hotpack    [] Massage   51663      [] Dry Needling, 1 or 2 muscles  27726   [] Biofeedback, first 15 minutes   28780  [] Biofeedback, additional 15 minutes   41838 [] Dry Needling, 3 or more muscles  44302       Patient Status:     [x] Continue per initial plan of care. Continue for two additional visits, then will DC to HEP    [] Additional visits necessary. [] Other:             Electronically signed by Reece Abda PT on 2/23/2022 at 4:55 PM      If you have any questions or concerns, please don't hesitate to call. Thank you for your referral.    Physician Signature:________________________________Date:__________________  By signing above or cosigning this note, I have reviewed this plan of care and certify a need for medically necessary rehabilitation services.      *PLEASE SIGN ABOVE AND FAX BACK ALL PAGES*

## 2022-02-23 NOTE — FLOWSHEET NOTE
[] [address]  [address]  [phone]  F: [phone] [] [address]  [facility]   [address]  P: [phone]  F: [phone] [] [address]  [address] [facility]  P: [phone]  F: [phone] [x] [address]  P: [phone]  F: [phone] [] [address]  [facility]   [address]   [place]: [phone]  F: [phone]      Physical Therapy Daily Treatment Note    Date:  2022  Patient Name:  Barbara Sims    :  1950  MRN: [id number]  Physician: Dr. Carol Caceres: Medicare  Medical Diagnosis: L TKA                Rehab Codes: T84. 093A  Onset date: DOS 10/11/21                Next 's appt. : 22    Visit# / total visits: ; Progress note for Medicare patient due at visit 20. Cancels/No Shows: 0/0    Subjective:    Pain:  [] Yes  [x] No Location: L Knee Pain Rating: (0-10 scale) -/10  Pain altered Tx:  [x] No  [] Yes  Action:  Comments: Pt arrives without knee pain, only having mouth pain secondary to a recent oral surgery. Shelbi Duron for only two more visits of therapy.       Objective:  Exercises: TAKE ROM MEASUREMENT   Exercise  S/p L knee TKA manipulation     Reps/ Time Weight/ Level Comments    Upright bike 7' Seat 5   x   Hamstring stretch 3x30\"   x   Gastroc strech 2x30\" wedge  x   LAQ 2x15   -   Leg press 2 x 10  40#   x   *TKE 2x10 plum   -   *Stair knee flexion stretch 3x30\"     x   Sit to stands 2 x 15  Visual cues for equal WB; focus on eccentric control x   TG- DL squats 3x10 L21  with quad set  -   TG- SL squats  2x15 L21 With quad set  x   Heel tap 3x10 6\" Lateral  x   Step up 2x10 6\" With no UE support to increase confidence on stairs x   Step downs  2x10 6\" Increased step height 1/31 -   Lateral side stepping with tband 2 laps    x   Rebounder 20x Red ball  x                 Mat        Long sitting calf stretch w/ strap, heel propped, and quad set  10x10\"   -   Prone Quad Set x20  With ball on shin  -   Prone quad stretch 3x30\"  Strap; contract/relay passive str  -          Prone hamstring curls 20x   -   *Supine heel slides with strap for overpressure 15x    with overpressure during flex and ext by PTA  -   *SLR  2x10   Cues for 4 step  x    Stair training   x3    Ascending and descending 6\" steps. Cues to minimize circumduction and eccentric quad control without hip hike  -                                               Other: Manual: STM to L quad, HS, and Gastroc -not today     Vasocompression: 15' L knee 34deg with bolster- not today, pt requests to ice at home     Specific Instructions for next treatment: Focus on stairs and quad strength Measure ROM every other visit       1/10/22 1/12/22 1/14/22 1/18/22 1/21/22 1/24/22 1/28/22 2/4/22 2/10/22 2/14/22 2/23/22   L knee AROM 6-96 3-100 2-98 2-110 2-105 0-108 0-110 5-108 2 - 105 0-105 0-106   L knee PROM 0-99 1-106 0 -108 0-112 0-112 0-113 0-115 0-112 0 - 112 0-110 0-114         Treatment Charges: Mins Units   [x]  Modalities: MHP 5 0   [x]  Ther Exercise 40 3   []  Manual Therapy     []  Ther Activities     []  Aquatics     []  Vasocompression     []  Other     Total Treatment time 45 3       Assessment: [x] Progressing toward goals. Initiated session with MHP followed by upright bike. Followed with standing exercises, focus on navigating stairs which pt was able to complete without use of BUE. Completed goal assessment with patient meeting all goals. Pt will attend two more visits of PT and then discharge next week to [facility]. [] No change. [] Other:     [x] Patient would continue to benefit from skilled physical therapy services in order to:  Increase L knee active and passive ROM while also increase L quad strength. Pt will also benefit from continued PT to address gait deficits and improve ability on stairs.      Goals:  STG: (to be met in 10 treatments) Goals assessed by primary PT Blanca Leal  1. ? Pain: Decrease pain levels to 0/10 MET (1/25)  2. ? ROM: Increase flexibility and AROM limitations throughout to equal bilat to reduce difficulty with ADLs, increase L knee AROM to 0-100deg MET (1/25)  3. ? Strength: Increase L knee quad strength to 4+/5 MET (1/25)  4. ? Function: Pt to ascend steps with a reciprocal gait pattern MET (1/25), pt states continues to descend non-reciprocally, MET for both ascending and descending (2/23)  5. Independent with Home Exercise Programs MET (1/25)        LTG: (to be met in 20 treatments)  1. Improve score on assessment tool from 47% impaired to 25% impaired demonstrating an improvement in ADLs MET (1/25) Pt currently at 25% impaired  2. Reduce pain levels to 0/10 MET (1/25)  3. Pt to have increased L knee AROM from 0-115 degrees (Goal added 1/25) MET 2/23      Pt. Education:  [x] Yes  [] No  [] Reviewed Prior HEP/Ed  Method of Education: [x] Verbal  [] Demo  [] Written  Comprehension of Education:  [] Verbalizes understanding. [] Demonstrates understanding. [x] Needs review. [x] Demonstrates/verbalizes HEP/Ed previously given. Plan: [x] Continue current frequency toward long and short term goals.     [] Specific Instructions for subsequent treatments:   Frequency:  5 x/week for 20 visits; 5x/wk for the first two weeks, 2-3x/wk after       Time In: 455           Time Out: 545     Electronically signed by:  Alexandre Galeana, PT no

## 2022-02-24 ENCOUNTER — HOSPITAL ENCOUNTER (OUTPATIENT)
Dept: PHYSICAL THERAPY | Facility: CLINIC | Age: 72
Setting detail: THERAPIES SERIES
Discharge: HOME | End: 2022-02-24
Payer: MEDICARE

## 2022-02-24 PROCEDURE — 97110 THERAPEUTIC EXERCISES: CPT

## 2022-02-24 NOTE — FLOWSHEET NOTE
[] [address]  [address]  [phone]  F: [phone] [] [address]  [facility]   [address]  P: [phone]  F: [phone] [] [place]  [address]  P: [phone]  F: [phone] [x] [address]  P: [phone]  F: [phone] [] [address]  [facility]   [address]   [place]: [phone]  F: [phone]      Physical Therapy Daily Treatment Note    Date:  2022  Patient Name:  Jared Huston    :  1950  MRN: [id number]  Physician: Dr. Kym Posey: Medicare  Medical Diagnosis: L TKA                Rehab Codes: T84. 093A  Onset date: DOS 10/11/21                Next Dr's appt. : 22    Visit# / total visits: ; Cancels/No Shows: 0/0    Subjective:    Pain:  [] Yes  [x] No Location: L Knee Pain Rating: (0-10 scale) 0/10  Pain altered Tx:  [x] No  [] Yes  Action:  Comments: No pain on arrival. Biggest concern is strength deficits going up stairs with L LE.     Objective:  Exercises:   Exercise  S/p L knee TKA manipulation     Reps/ Time Weight/ Level Comments    Upright bike 7' Seat 5   x   Hamstring stretch 3x30\"   x   Gastroc strech 2x30\" wedge  x   LAQ 2x15   -   Leg press 2 x 15  40#   x   *TKE 2x10 plum   -   *Stair knee flexion stretch 3x30\"     x   Sit to stands 2 x 15  focus on eccentric control x   TG- DL squats 3x10 L21  with quad set  -   TG- SL squats  2x15 L21 With quad set  x   Heel tap 3x10 6\" Lateral  x   Step up 2x15 6\" With no UE support to increase confidence on stairs x   Step downs  2x10 6\" Increased step height  -   Lateral side stepping with tband 2 laps    x   Rebounder 25x Red ball  x                 Mat Long sitting calf stretch w/ strap, heel propped, and quad set  10x10\"   -   Prone Quad Set x20  With ball on shin  -   Prone quad stretch 3x30\"  Strap; contract/relay passive str  -          Prone hamstring curls 20x   -   *Supine heel slides with strap for overpressure 15x    with overpressure during flex and ext by PTA  -   *SLR  2x10   Cues for 4 step  x    Stair training   x3    Ascending and descending 6\" steps. Cues to minimize circumduction and eccentric quad control without hip hike  -                                               Other: Manual: STM to L quad, HS, and Gastroc -not today     Vasocompression: 15' L knee 34deg with bolster- not today     Specific Instructions for next treatment: Focus on stairs and quad strength Measure ROM every other visit       1/10/22 1/12/22 1/14/22 1/18/22 1/21/22 1/24/22 1/28/22 2/4/22 2/10/22 2/14/22 2/23/22   L knee AROM 6-96 3-100 2-98 2-110 2-105 0-108 0-110 5-108 2 - 105 0-105 0-106   L knee PROM 0-99 1-106 0 -108 0-112 0-112 0-113 0-115 0-112 0 - 112 0-110 0-114         Treatment Charges: Mins Units   [x]  Modalities: MHP 5 0   [x]  Ther Exercise 40 3   []  Manual Therapy     []  Ther Activities     []  Aquatics     []  Vasocompression     []  Other     Total Treatment time 45 3       Assessment: [x] Progressing toward goals. Initiated session with P followed by upright bike. Verbal and visual cues during L LE closed chain heel taps to prevent compensation. Patient demonstrates some weakness and instability during step work, challenging to complete without use of hand support. Tends to use R LE toe touch during rebounder with left stance leg. No complaints at end of session. Feels ready to discharge after next visit. [] No change. [] Other:     [x] Patient would continue to benefit from skilled physical therapy services in order to: Increase L knee active and passive ROM while also increase L quad strength.  Pt will also benefit from continued PT to address gait deficits and improve ability on stairs.      Goals:  STG: (to be met in 10 treatments) Goals assessed by primary PT Heaven Middleton  1. ? Pain: Decrease pain levels to 0/10 MET (1/25)  2. ? ROM: Increase flexibility and AROM limitations throughout to equal bilat to reduce difficulty with ADLs, increase L knee AROM to 0-100deg MET (1/25)  3. ? Strength: Increase L knee quad strength to 4+/5 MET (1/25)  4. ? Function: Pt to ascend steps with a reciprocal gait pattern MET (1/25), pt states continues to descend non-reciprocally, MET for both ascending and descending (2/23)  5. Independent with Home Exercise Programs MET (1/25)        LTG: (to be met in 20 treatments)  1. Improve score on assessment tool from 47% impaired to 25% impaired demonstrating an improvement in ADLs MET (1/25) Pt currently at 25% impaired  2. Reduce pain levels to 0/10 MET (1/25)  3. Pt to have increased L knee AROM from 0-115 degrees (Goal added 1/25) MET 2/23      Pt. Education:  [x] Yes  [] No  [] Reviewed Prior HEP/Ed  Method of Education: [x] Verbal  [] Demo  [] Written  Comprehension of Education:  [x] Verbalizes understanding. [] Demonstrates understanding. [x] Needs review. [x] Demonstrates/verbalizes HEP/Ed previously given. Plan: [x] Continue current frequency toward long and short term goals.     [x] Specific Instructions for subsequent treatments: Prep for discharge    Frequency:  5 x/week for 20 visits; 5x/wk for the first two weeks, 2-3x/wk after       Time In: 5:00PM           Time Out: 5:45PM     Electronically signed by:  Annita Allen PTA

## 2022-02-28 ENCOUNTER — HOSPITAL ENCOUNTER (OUTPATIENT)
Dept: PHYSICAL THERAPY | Facility: CLINIC | Age: 72
Setting detail: THERAPIES SERIES
Discharge: HOME | End: 2022-02-28
Payer: MEDICARE

## 2022-02-28 PROCEDURE — 97110 THERAPEUTIC EXERCISES: CPT

## 2022-02-28 NOTE — FLOWSHEET NOTE
[] [address]  [address]  [phone]  F: [phone] [] [address]  [employer] [address]   [address]  P: [phone]  F: [phone] [] [address]  [facility]  [address] [facility]  P: [phone]  F: [phone] [x] [address]  P: [phone]  F: [phone] [] [address]  [facility]   [address]   [place]: [phone]  F: [phone]      Physical Therapy Daily Treatment Note    Date:  2022  Patient Name:  Davie Miller    :  1950  MRN: [id number]  Physician: Dr. Huntley Leaver: Medicare  Medical Diagnosis: L TKA                Rehab Codes: T84. 093A  Onset date: DOS 10/11/21                Next 's appt. : 22    Visit# / total visits: ; Cancels/No Shows: 0/0    Subjective:    Pain:  [] Yes  [x] No Location: L Knee Pain Rating: (0-10 scale) 0/10  Pain altered Tx:  [x] No  [] Yes  Action:  Comments: Pt arrives without pain, states ready to be discharged this date.      Objective:  Exercises:   Exercise  S/p L knee TKA manipulation     Reps/ Time Weight/ Level Comments    Upright bike 5' Seat 5   x   Hamstring stretch 3x30\"   x   Gastroc strech 2x30\" wedge  x   LAQ 2x15   -   Leg press 2 x 15  40#   x   *TKE 2x10 plum   -   *Stair knee flexion stretch 3x30\"     x   Sit to stands 2 x 15  focus on eccentric control x   TG- DL squats 3x10 L21  with quad set  -   TG- SL squats  2x15 L21 With quad set  -   Heel tap 3x10 6\" Lateral  x   Step up 2x15 6\" With no UE support to increase confidence on stairs x   Step downs  2x10 6\" Increased step height  -   Lateral side stepping with tband 2 laps  blue  x   Rebounder 25x Red ball  -                 Mat        Long sitting calf stretch w/ strap, heel propped, and quad set  10x10\"   -   Prone Quad Set x20  With ball on shin  -   Prone quad stretch 3x30\"  Strap; contract/relay passive str  -          Prone hamstring curls 20x   -   *Supine heel slides with strap for overpressure 15x    with overpressure during flex and ext by PTA  -   *SLR  2x10   Cues for 4 step  x    Stair training   x3    Ascending and descending 6\" steps. Cues to minimize circumduction and eccentric quad control without hip hike  -                                               Other: Manual: STM to L quad, HS, and Gastroc -not today     Vasocompression: 15' L knee 34deg with bolster- not today     Specific Instructions for next treatment: Focus on stairs and quad strength Measure ROM every other visit       1/10/22 1/12/22 1/14/22 1/18/22 1/21/22 1/24/22 1/28/22 2/4/22 2/10/22 2/14/22 2/23/22   L knee AROM 6-96 3-100 2-98 2-110 2-105 0-108 0-110 5-108 2 - 105 0-105 0-106   L knee PROM 0-99 1-106 0 -108 0-112 0-112 0-113 0-115 0-112 0 - 112 0-110 0-114         Treatment Charges: Mins Units   []  Modalities: MHP  0   [x]  Ther Exercise 35 2   []  Manual Therapy     []  Ther Activities     []  Aquatics     []  Vasocompression     []  Other     Total Treatment time 45 3       Assessment: [x] Progressing toward goals. Pt has met all goals and is agreeable for today to be last visit. Session spent reviewing most important exercises for HEP as well as ones that can be done at the gym. Pt completed all exercises with good technique as well as painfree. [] No change. [] Other:     [x] Patient would continue to benefit from skilled physical therapy services in order to: Increase L knee active and passive ROM while also increase L quad strength.  Pt will also benefit from continued PT to address gait deficits and improve ability on stairs.      Goals:  STG: (to be met in 10 treatments)   1. ? Pain: Decrease pain levels to 0/10 MET (1/25)  2. ? ROM: Increase flexibility and AROM limitations throughout to equal bilat to reduce difficulty with ADLs, increase L knee AROM to 0-100deg MET (1/25)  3. ? Strength: Increase L knee quad strength to 4+/5 MET (1/25)  4. ? Function: Pt to ascend steps with a reciprocal gait pattern MET (1/25), pt states continues to descend non-reciprocally, MET for both ascending and descending (2/23)  5. Independent with Home Exercise Programs MET (1/25)        LTG: (to be met in 20 treatments)  1. Improve score on assessment tool from 47% impaired to 25% impaired demonstrating an improvement in ADLs MET (1/25) Pt currently at 25% impaired  2. Reduce pain levels to 0/10 MET (1/25)  3. Pt to have increased L knee AROM from 0-115 degrees (Goal added 1/25) MET 2/23      Pt. Education:  [x] Yes  [] No  [] Reviewed Prior HEP/Ed  Method of Education: [x] Verbal  [] Demo  [] Written  Comprehension of Education:  [x] Verbalizes understanding. [] Demonstrates understanding. [x] Needs review. [x] Demonstrates/verbalizes HEP/Ed previously given. Plan: [x] Continue current frequency toward long and short term goals.     [x] Specific Instructions for subsequent treatments: Prep for discharge    Frequency:  5 x/week for 20 visits; 5x/wk for the first two weeks, 2-3x/wk after       Time In: 5:00 pm            Time Out: 5:55pm     Electronically signed by:  Betty Dale, PT